# Patient Record
Sex: FEMALE | Race: WHITE | NOT HISPANIC OR LATINO | URBAN - METROPOLITAN AREA
[De-identification: names, ages, dates, MRNs, and addresses within clinical notes are randomized per-mention and may not be internally consistent; named-entity substitution may affect disease eponyms.]

---

## 2017-08-25 DIAGNOSIS — Z00.00 HEALTH CARE MAINTENANCE: Primary | ICD-10-CM

## 2017-08-28 LAB
25(OH)D3+25(OH)D2 SERPL-MCNC: 28.1 NG/ML (ref 30–100)
ALBUMIN SERPL-MCNC: 4.9 G/DL (ref 3.5–5.2)
ALBUMIN/GLOB SERPL: 1.8 G/DL
ALP SERPL-CCNC: 38 U/L (ref 39–117)
ALT SERPL-CCNC: 6 U/L (ref 1–33)
AST SERPL-CCNC: 15 U/L (ref 1–32)
BASOPHILS # BLD AUTO: 0.04 10*3/MM3 (ref 0–0.2)
BASOPHILS NFR BLD AUTO: 0.6 % (ref 0–1.5)
BILIRUB SERPL-MCNC: 0.7 MG/DL (ref 0.1–1.2)
BUN SERPL-MCNC: 13 MG/DL (ref 6–20)
BUN/CREAT SERPL: 17.1 (ref 7–25)
CALCIUM SERPL-MCNC: 9.9 MG/DL (ref 8.6–10.5)
CHLORIDE SERPL-SCNC: 102 MMOL/L (ref 98–107)
CHOLEST SERPL-MCNC: 171 MG/DL (ref 0–200)
CO2 SERPL-SCNC: 25.5 MMOL/L (ref 22–29)
CREAT SERPL-MCNC: 0.76 MG/DL (ref 0.57–1)
EOSINOPHIL # BLD AUTO: 0.21 10*3/MM3 (ref 0–0.7)
EOSINOPHIL NFR BLD AUTO: 3.2 % (ref 0.3–6.2)
ERYTHROCYTE [DISTWIDTH] IN BLOOD BY AUTOMATED COUNT: 12.5 % (ref 11.7–13)
GLOBULIN SER CALC-MCNC: 2.7 GM/DL
GLUCOSE SERPL-MCNC: 99 MG/DL (ref 65–99)
HBA1C MFR BLD: 5.02 % (ref 4.8–5.6)
HCT VFR BLD AUTO: 42.4 % (ref 35.6–45.5)
HDLC SERPL-MCNC: 56 MG/DL (ref 40–60)
HGB BLD-MCNC: 13.9 G/DL (ref 11.9–15.5)
IMM GRANULOCYTES # BLD: 0 10*3/MM3 (ref 0–0.03)
IMM GRANULOCYTES NFR BLD: 0 % (ref 0–0.5)
LDLC SERPL CALC-MCNC: 97 MG/DL (ref 0–100)
LYMPHOCYTES # BLD AUTO: 3.55 10*3/MM3 (ref 0.9–4.8)
LYMPHOCYTES NFR BLD AUTO: 54.9 % (ref 19.6–45.3)
MCH RBC QN AUTO: 29.4 PG (ref 26.9–32)
MCHC RBC AUTO-ENTMCNC: 32.8 G/DL (ref 32.4–36.3)
MCV RBC AUTO: 89.8 FL (ref 80.5–98.2)
MONOCYTES # BLD AUTO: 0.59 10*3/MM3 (ref 0.2–1.2)
MONOCYTES NFR BLD AUTO: 9.1 % (ref 5–12)
NEUTROPHILS # BLD AUTO: 2.08 10*3/MM3 (ref 1.9–8.1)
NEUTROPHILS NFR BLD AUTO: 32.2 % (ref 42.7–76)
PLATELET # BLD AUTO: 223 10*3/MM3 (ref 140–500)
POTASSIUM SERPL-SCNC: 4.4 MMOL/L (ref 3.5–5.2)
PROT SERPL-MCNC: 7.6 G/DL (ref 6–8.5)
RBC # BLD AUTO: 4.72 10*6/MM3 (ref 3.9–5.2)
SODIUM SERPL-SCNC: 143 MMOL/L (ref 136–145)
TRIGL SERPL-MCNC: 92 MG/DL (ref 0–150)
TSH SERPL DL<=0.005 MIU/L-ACNC: 1.39 MIU/ML (ref 0.27–4.2)
VLDLC SERPL CALC-MCNC: 18.4 MG/DL (ref 5–40)
WBC # BLD AUTO: 6.47 10*3/MM3 (ref 4.5–10.7)

## 2017-08-30 LAB
APPEARANCE UR: CLEAR
BACTERIA #/AREA URNS HPF: NORMAL /HPF
BACTERIA UR CULT: NO GROWTH
BACTERIA UR CULT: NORMAL
BILIRUB UR QL STRIP: NEGATIVE
COLOR UR: YELLOW
EPI CELLS #/AREA URNS HPF: NORMAL /HPF
GLUCOSE UR QL: NEGATIVE
HGB UR QL STRIP: NEGATIVE
KETONES UR QL STRIP: NEGATIVE
LEUKOCYTE ESTERASE UR QL STRIP: ABNORMAL
MICRO URNS: ABNORMAL
MUCOUS THREADS URNS QL MICRO: PRESENT /HPF
NITRITE UR QL STRIP: NEGATIVE
PH UR STRIP: 7.5 [PH] (ref 5–7.5)
PROT UR QL STRIP: NEGATIVE
RBC #/AREA URNS HPF: NORMAL /HPF
SP GR UR: 1.01 (ref 1–1.03)
URINALYSIS REFLEX: ABNORMAL
UROBILINOGEN UR STRIP-MCNC: 0.2 MG/DL (ref 0.2–1)
WBC #/AREA URNS HPF: NORMAL /HPF

## 2017-10-09 ENCOUNTER — OFFICE VISIT (OUTPATIENT)
Dept: INTERNAL MEDICINE | Facility: CLINIC | Age: 32
End: 2017-10-09

## 2017-10-09 VITALS
DIASTOLIC BLOOD PRESSURE: 84 MMHG | OXYGEN SATURATION: 99 % | BODY MASS INDEX: 24.32 KG/M2 | WEIGHT: 146 LBS | RESPIRATION RATE: 18 BRPM | SYSTOLIC BLOOD PRESSURE: 122 MMHG | HEART RATE: 67 BPM | HEIGHT: 65 IN

## 2017-10-09 DIAGNOSIS — Z00.00 HEALTH MAINTENANCE EXAMINATION: Primary | ICD-10-CM

## 2017-10-09 PROCEDURE — 99395 PREV VISIT EST AGE 18-39: CPT | Performed by: INTERNAL MEDICINE

## 2017-10-09 NOTE — PROGRESS NOTES
Chief Complaint  Cynthia Box is a 32 y.o. female who presents for Annual Exam (CPE)  .    History of Present Illness   Cynthia is here for routine CPE.  No new concerns.    Pap: two years ago  C-scope: n/a  Declines flu shot.    Exercise: 3 days a week for an hour.      There is no immunization history on file for this patient.    Review of Systems   Constitution: Negative for chills, fever and malaise/fatigue.   HENT: Negative for hearing loss, hoarse voice and sore throat.    Eyes: Negative for blurred vision, double vision and visual disturbance.   Cardiovascular: Negative for chest pain, leg swelling and palpitations.   Respiratory: Negative for cough and shortness of breath.    Endocrine: Negative for polydipsia and polyuria.   Hematologic/Lymphatic: Does not bruise/bleed easily.   Skin: Negative for rash and suspicious lesions.   Musculoskeletal: Negative for arthritis and myalgias.   Gastrointestinal: Negative for abdominal pain, change in bowel habit, constipation, diarrhea, dysphagia, hematochezia, melena, nausea and vomiting.   Genitourinary: Negative for dysuria and hematuria.   Neurological: Negative for dizziness, headaches and light-headedness.   Psychiatric/Behavioral: Negative for depression. The patient is not nervous/anxious.        There is no problem list on file for this patient.      History reviewed. No pertinent past medical history.    Past Surgical History:   Procedure Laterality Date   • LEEP  2009       Family History   Problem Relation Age of Onset   • Heart attack Father    • Cancer Maternal Grandmother      colon   • Heart attack Paternal Grandfather        Social History     Social History   • Marital status:      Spouse name: N/A   • Number of children: 1   • Years of education: N/A     Occupational History   •       Social History Main Topics   • Smoking status: Never Smoker   • Smokeless tobacco: Never Used   • Alcohol use Yes      Comment: socially   • Drug  "use: No   • Sexual activity: Not on file     Other Topics Concern   • Not on file     Social History Narrative       No current outpatient prescriptions on file prior to visit.     No current facility-administered medications on file prior to visit.        Allergies   Allergen Reactions   • Codeine        Vitals:    10/09/17 1358   BP: 122/84   Pulse: 67   Resp: 18   SpO2: 99%   Weight: 146 lb (66.2 kg)   Height: 65\" (165.1 cm)       Body mass index is 24.3 kg/(m^2).    Objective   Physical Exam   Constitutional: She is oriented to person, place, and time. She appears well-developed and well-nourished. No distress.   HENT:   Head: Normocephalic and atraumatic.   Nose: Nose normal.   Mouth/Throat: Oropharynx is clear and moist.   Eyes: Conjunctivae and EOM are normal. Pupils are equal, round, and reactive to light. No scleral icterus.   Neck: Normal range of motion. Neck supple. No thyromegaly present.   Cardiovascular: Normal rate, regular rhythm and normal heart sounds.  Exam reveals no gallop and no friction rub.    No murmur heard.  Pulses:       Carotid pulses are 2+ on the right side, and 2+ on the left side.       Femoral pulses are 2+ on the right side, and 2+ on the left side.       Dorsalis pedis pulses are 2+ on the right side, and 2+ on the left side.        Posterior tibial pulses are 2+ on the right side, and 2+ on the left side.   Pulmonary/Chest: Effort normal and breath sounds normal. No respiratory distress. She has no wheezes. She has no rales. Right breast exhibits no mass and no nipple discharge. Left breast exhibits no mass and no nipple discharge.   Abdominal: Soft. Bowel sounds are normal. She exhibits no distension and no mass. There is no tenderness.   Musculoskeletal: Normal range of motion. She exhibits no edema.       Vascular Status -  Her exam exhibits no right foot edema. Her exam exhibits no left foot edema.   Skin Integrity  -  Her right foot skin is intact.     Cynthia 's left foot " skin is intact. .  Lymphadenopathy:     She has no cervical adenopathy.     She has no axillary adenopathy.        Right: No inguinal and no supraclavicular adenopathy present.        Left: No inguinal and no supraclavicular adenopathy present.   Neurological: She is alert and oriented to person, place, and time. She has normal reflexes. No cranial nerve deficit.   Skin: Skin is warm and dry.   Psychiatric: She has a normal mood and affect. Her speech is normal and behavior is normal. Judgment and thought content normal. Cognition and memory are normal.   Vitals reviewed.        Results for orders placed or performed in visit on 08/25/17   Comprehensive Metabolic Panel   Result Value Ref Range    Glucose 99 65 - 99 mg/dL    BUN 13 6 - 20 mg/dL    Creatinine 0.76 0.57 - 1.00 mg/dL    eGFR Non African Am 87 >60 mL/min/1.73    eGFR African Am 106 >60 mL/min/1.73    BUN/Creatinine Ratio 17.1 7.0 - 25.0    Sodium 143 136 - 145 mmol/L    Potassium 4.4 3.5 - 5.2 mmol/L    Chloride 102 98 - 107 mmol/L    Total CO2 25.5 22.0 - 29.0 mmol/L    Calcium 9.9 8.6 - 10.5 mg/dL    Total Protein 7.6 6.0 - 8.5 g/dL    Albumin 4.90 3.50 - 5.20 g/dL    Globulin 2.7 gm/dL    A/G Ratio 1.8 g/dL    Total Bilirubin 0.7 0.1 - 1.2 mg/dL    Alkaline Phosphatase 38 (L) 39 - 117 U/L    AST (SGOT) 15 1 - 32 U/L    ALT (SGPT) 6 1 - 33 U/L   Lipid Panel   Result Value Ref Range    Total Cholesterol 171 0 - 200 mg/dL    Triglycerides 92 0 - 150 mg/dL    HDL Cholesterol 56 40 - 60 mg/dL    VLDL Cholesterol 18.4 5 - 40 mg/dL    LDL Cholesterol  97 0 - 100 mg/dL   TSH Rfx On Abnormal To Free T4   Result Value Ref Range    TSH 1.39 0.27 - 4.2 mIU/mL   UA / M With / Rflx Culture([facility] ONLY)   Result Value Ref Range    Specific Gravity, UA 1.013 1.005 - 1.030    pH, UA 7.5 5.0 - 7.5    Color, UA Yellow Yellow    Appearance, UA Clear Clear    Leukocytes, UA 1+ (A) Negative    Protein Negative Negative/Trace    Glucose, UA Negative Negative    Ketones  Negative Negative    Blood, UA Negative Negative    Bilirubin, UA Negative Negative    Urobilinogen, UA 0.2 0.2 - 1.0 mg/dL    Nitrite, UA Negative Negative    Microscopic Examination See below:     Urinalysis Reflex Comment    Vitamin D 25 Hydroxy   Result Value Ref Range    25 Hydroxy, Vitamin D 28.1 (L) 30.0 - 100.0 ng/mL   Hemoglobin A1c   Result Value Ref Range    Hemoglobin A1C 5.02 4.80 - 5.60 %   Microscopic Examination   Result Value Ref Range    WBC, UA 0-5 0 - 5 /hpf    RBC, UA 0-2 0 - 2 /hpf    Epithelial Cells (non renal) 0-10 0 - 10 /hpf    Mucus, UA Present Not Estab. /hpf    Bacteria, UA Few None seen/Few /hpf   Urine Culture, Routine   Result Value Ref Range    Urine Culture Final report     Result 1 No growth    CBC & Differential   Result Value Ref Range    WBC 6.47 4.50 - 10.70 10*3/mm3    RBC 4.72 3.90 - 5.20 10*6/mm3    Hemoglobin 13.9 11.9 - 15.5 g/dL    Hematocrit 42.4 35.6 - 45.5 %    MCV 89.8 80.5 - 98.2 fL    MCH 29.4 26.9 - 32.0 pg    MCHC 32.8 32.4 - 36.3 g/dL    RDW 12.5 11.7 - 13.0 %    Platelets 223 140 - 500 10*3/mm3    Neutrophil Rel % 32.2 (L) 42.7 - 76.0 %    Lymphocyte Rel % 54.9 (H) 19.6 - 45.3 %    Monocyte Rel % 9.1 5.0 - 12.0 %    Eosinophil Rel % 3.2 0.3 - 6.2 %    Basophil Rel % 0.6 0.0 - 1.5 %    Neutrophils Absolute 2.08 1.90 - 8.10 10*3/mm3    Lymphocytes Absolute 3.55 0.90 - 4.80 10*3/mm3    Monocytes Absolute 0.59 0.20 - 1.20 10*3/mm3    Eosinophils Absolute 0.21 0.00 - 0.70 10*3/mm3    Basophils Absolute 0.04 0.00 - 0.20 10*3/mm3    Immature Granulocyte Rel % 0.0 0.0 - 0.5 %    Immature Grans Absolute 0.00 0.00 - 0.03 10*3/mm3       Assessment/Plan   Diagnoses and all orders for this visit:    Health maintenance examination        Discussion/Summary  Cynthia is here for routine CPE.  She is up to date on health maintenance.  Her labs all look ok.  The differential is off on her CBC but her WBC and absolute numbers are normal.  This is similar to last year.  We will  recheck yearly.      No Follow-up on file.

## 2018-10-05 DIAGNOSIS — Z00.00 HEALTH CARE MAINTENANCE: Primary | ICD-10-CM

## 2020-01-01 PROCEDURE — 99284 EMERGENCY DEPT VISIT MOD MDM: CPT

## 2020-01-02 ENCOUNTER — APPOINTMENT (OUTPATIENT)
Dept: CT IMAGING | Facility: HOSPITAL | Age: 35
End: 2020-01-02

## 2020-01-02 ENCOUNTER — HOSPITAL ENCOUNTER (EMERGENCY)
Facility: HOSPITAL | Age: 35
Discharge: HOME | End: 2020-01-02
Attending: EMERGENCY MEDICINE | Admitting: EMERGENCY MEDICINE

## 2020-01-02 VITALS
WEIGHT: 138.9 LBS | DIASTOLIC BLOOD PRESSURE: 68 MMHG | SYSTOLIC BLOOD PRESSURE: 102 MMHG | OXYGEN SATURATION: 98 % | TEMPERATURE: 98.1 F | RESPIRATION RATE: 16 BRPM | BODY MASS INDEX: 23.14 KG/M2 | HEIGHT: 65 IN | HEART RATE: 88 BPM

## 2020-01-02 DIAGNOSIS — K50.00 TERMINAL ILEITIS WITHOUT COMPLICATION (HCC): ICD-10-CM

## 2020-01-02 DIAGNOSIS — K57.32 CECAL DIVERTICULITIS: Primary | ICD-10-CM

## 2020-01-02 LAB
ALBUMIN SERPL-MCNC: 4.3 G/DL (ref 3.5–5.2)
ALBUMIN/GLOB SERPL: 1.5 G/DL
ALP SERPL-CCNC: 40 U/L (ref 39–117)
ALT SERPL W P-5'-P-CCNC: 7 U/L (ref 1–33)
ANION GAP SERPL CALCULATED.3IONS-SCNC: 11.6 MMOL/L (ref 5–15)
AST SERPL-CCNC: 11 U/L (ref 1–32)
BACTERIA UR QL AUTO: ABNORMAL /HPF
BASOPHILS # BLD AUTO: 0.04 10*3/MM3 (ref 0–0.2)
BASOPHILS NFR BLD AUTO: 0.3 % (ref 0–1.5)
BILIRUB SERPL-MCNC: 0.4 MG/DL (ref 0.2–1.2)
BILIRUB UR QL STRIP: NEGATIVE
BUN BLD-MCNC: 11 MG/DL (ref 6–20)
BUN/CREAT SERPL: 16.2 (ref 7–25)
CALCIUM SPEC-SCNC: 8.9 MG/DL (ref 8.6–10.5)
CHLORIDE SERPL-SCNC: 103 MMOL/L (ref 98–107)
CLARITY UR: CLEAR
CO2 SERPL-SCNC: 24.4 MMOL/L (ref 22–29)
COLOR UR: YELLOW
CREAT BLD-MCNC: 0.68 MG/DL (ref 0.57–1)
DEPRECATED RDW RBC AUTO: 37.6 FL (ref 37–54)
EOSINOPHIL # BLD AUTO: 0.2 10*3/MM3 (ref 0–0.4)
EOSINOPHIL NFR BLD AUTO: 1.6 % (ref 0.3–6.2)
ERYTHROCYTE [DISTWIDTH] IN BLOOD BY AUTOMATED COUNT: 11.9 % (ref 12.3–15.4)
GFR SERPL CREATININE-BSD FRML MDRD: 98 ML/MIN/1.73
GLOBULIN UR ELPH-MCNC: 2.9 GM/DL
GLUCOSE BLD-MCNC: 101 MG/DL (ref 65–99)
GLUCOSE UR STRIP-MCNC: NEGATIVE MG/DL
HCG SERPL QL: NEGATIVE
HCT VFR BLD AUTO: 36.2 % (ref 34–46.6)
HGB BLD-MCNC: 12.4 G/DL (ref 12–15.9)
HGB UR QL STRIP.AUTO: NEGATIVE
HYALINE CASTS UR QL AUTO: ABNORMAL /LPF
IMM GRANULOCYTES # BLD AUTO: 0.04 10*3/MM3 (ref 0–0.05)
IMM GRANULOCYTES NFR BLD AUTO: 0.3 % (ref 0–0.5)
KETONES UR QL STRIP: NEGATIVE
LEUKOCYTE ESTERASE UR QL STRIP.AUTO: ABNORMAL
LYMPHOCYTES # BLD AUTO: 1.61 10*3/MM3 (ref 0.7–3.1)
LYMPHOCYTES NFR BLD AUTO: 13.3 % (ref 19.6–45.3)
MCH RBC QN AUTO: 29.9 PG (ref 26.6–33)
MCHC RBC AUTO-ENTMCNC: 34.3 G/DL (ref 31.5–35.7)
MCV RBC AUTO: 87.2 FL (ref 79–97)
MONOCYTES # BLD AUTO: 0.9 10*3/MM3 (ref 0.1–0.9)
MONOCYTES NFR BLD AUTO: 7.4 % (ref 5–12)
NEUTROPHILS # BLD AUTO: 9.34 10*3/MM3 (ref 1.7–7)
NEUTROPHILS NFR BLD AUTO: 77.1 % (ref 42.7–76)
NITRITE UR QL STRIP: NEGATIVE
NRBC BLD AUTO-RTO: 0 /100 WBC (ref 0–0.2)
PH UR STRIP.AUTO: 6.5 [PH] (ref 5–8)
PLATELET # BLD AUTO: 181 10*3/MM3 (ref 140–450)
PMV BLD AUTO: 10.1 FL (ref 6–12)
POTASSIUM BLD-SCNC: 3.8 MMOL/L (ref 3.5–5.2)
PROT SERPL-MCNC: 7.2 G/DL (ref 6–8.5)
PROT UR QL STRIP: NEGATIVE
RBC # BLD AUTO: 4.15 10*6/MM3 (ref 3.77–5.28)
RBC # UR: ABNORMAL /HPF
REF LAB TEST METHOD: ABNORMAL
SODIUM BLD-SCNC: 139 MMOL/L (ref 136–145)
SP GR UR STRIP: 1.01 (ref 1–1.03)
SQUAMOUS #/AREA URNS HPF: ABNORMAL /HPF
UROBILINOGEN UR QL STRIP: ABNORMAL
WBC NRBC COR # BLD: 12.13 10*3/MM3 (ref 3.4–10.8)
WBC UR QL AUTO: ABNORMAL /HPF

## 2020-01-02 PROCEDURE — 96375 TX/PRO/DX INJ NEW DRUG ADDON: CPT

## 2020-01-02 PROCEDURE — 25010000002 MORPHINE PER 10 MG: Performed by: EMERGENCY MEDICINE

## 2020-01-02 PROCEDURE — 96376 TX/PRO/DX INJ SAME DRUG ADON: CPT

## 2020-01-02 PROCEDURE — 25010000002 ONDANSETRON PER 1 MG: Performed by: EMERGENCY MEDICINE

## 2020-01-02 PROCEDURE — 81001 URINALYSIS AUTO W/SCOPE: CPT

## 2020-01-02 PROCEDURE — 96361 HYDRATE IV INFUSION ADD-ON: CPT

## 2020-01-02 PROCEDURE — 96365 THER/PROPH/DIAG IV INF INIT: CPT

## 2020-01-02 PROCEDURE — 74177 CT ABD & PELVIS W/CONTRAST: CPT

## 2020-01-02 PROCEDURE — 80053 COMPREHEN METABOLIC PANEL: CPT | Performed by: EMERGENCY MEDICINE

## 2020-01-02 PROCEDURE — 25010000002 IOPAMIDOL 61 % SOLUTION: Performed by: EMERGENCY MEDICINE

## 2020-01-02 PROCEDURE — 25010000002 PIPERACILLIN SOD-TAZOBACTAM PER 1 G: Performed by: EMERGENCY MEDICINE

## 2020-01-02 PROCEDURE — 85025 COMPLETE CBC W/AUTO DIFF WBC: CPT | Performed by: EMERGENCY MEDICINE

## 2020-01-02 PROCEDURE — 84703 CHORIONIC GONADOTROPIN ASSAY: CPT | Performed by: EMERGENCY MEDICINE

## 2020-01-02 RX ORDER — HYDROCODONE BITARTRATE AND ACETAMINOPHEN 7.5; 325 MG/1; MG/1
1 TABLET ORAL EVERY 4 HOURS PRN
Qty: 20 TABLET | Refills: 0 | Status: SHIPPED | OUTPATIENT
Start: 2020-01-02 | End: 2020-01-16

## 2020-01-02 RX ORDER — AMOXICILLIN AND CLAVULANATE POTASSIUM 875; 125 MG/1; MG/1
1 TABLET, FILM COATED ORAL 2 TIMES DAILY
Qty: 20 TABLET | Refills: 0 | Status: SHIPPED | OUTPATIENT
Start: 2020-01-02 | End: 2020-01-16

## 2020-01-02 RX ORDER — MORPHINE SULFATE 2 MG/ML
4 INJECTION, SOLUTION INTRAMUSCULAR; INTRAVENOUS ONCE
Status: COMPLETED | OUTPATIENT
Start: 2020-01-02 | End: 2020-01-02

## 2020-01-02 RX ORDER — ONDANSETRON 2 MG/ML
4 INJECTION INTRAMUSCULAR; INTRAVENOUS ONCE
Status: COMPLETED | OUTPATIENT
Start: 2020-01-02 | End: 2020-01-02

## 2020-01-02 RX ORDER — ONDANSETRON 4 MG/1
4 TABLET, FILM COATED ORAL EVERY 8 HOURS PRN
Qty: 20 TABLET | Refills: 0 | Status: SHIPPED | OUTPATIENT
Start: 2020-01-02 | End: 2020-02-19

## 2020-01-02 RX ORDER — SODIUM CHLORIDE 0.9 % (FLUSH) 0.9 %
10 SYRINGE (ML) INJECTION AS NEEDED
Status: DISCONTINUED | OUTPATIENT
Start: 2020-01-02 | End: 2020-01-02 | Stop reason: HOSPADM

## 2020-01-02 RX ORDER — SODIUM CHLORIDE 9 MG/ML
125 INJECTION, SOLUTION INTRAVENOUS CONTINUOUS
Status: DISCONTINUED | OUTPATIENT
Start: 2020-01-02 | End: 2020-01-02 | Stop reason: HOSPADM

## 2020-01-02 RX ADMIN — IOPAMIDOL 85 ML: 612 INJECTION, SOLUTION INTRAVENOUS at 02:48

## 2020-01-02 RX ADMIN — MORPHINE SULFATE 4 MG: 2 INJECTION, SOLUTION INTRAMUSCULAR; INTRAVENOUS at 03:44

## 2020-01-02 RX ADMIN — SODIUM CHLORIDE 125 ML/HR: 9 INJECTION, SOLUTION INTRAVENOUS at 01:28

## 2020-01-02 RX ADMIN — ONDANSETRON 4 MG: 2 INJECTION INTRAMUSCULAR; INTRAVENOUS at 01:29

## 2020-01-02 RX ADMIN — TAZOBACTAM SODIUM AND PIPERACILLIN SODIUM 3.38 G: 375; 3 INJECTION, SOLUTION INTRAVENOUS at 03:47

## 2020-01-02 RX ADMIN — MORPHINE SULFATE 4 MG: 2 INJECTION, SOLUTION INTRAMUSCULAR; INTRAVENOUS at 01:30

## 2020-01-02 NOTE — ED PROVIDER NOTES
" EMERGENCY DEPARTMENT ENCOUNTER    CHIEF COMPLAINT  Chief Complaint: RLQ abd pain  History given by: self  History limited by: nothing  Room Number: 14/14  PMD: Provider, No Known      HPI:  Pt is a 34 y.o. female who presents complaining of constant RLQ abd pain that started around 2100 this evening. Pt also c/o nausea and \"loose\" stool. Pt denies vomiting, dysuria, hematuria and fever. The pt states having a constant \"bloated\" feeling starting this morning around 1100 with several episodes of \"loose stool\" and nausea throughout the day. As the day progressed, she developed a sudden severe \"sharp\" RLQ abd pain at 2100. Due to severity of pain, she came to the ED for further evaluation. LKMP was 12/21/19.     Duration:  2100 this evening  Onset: sudden  Timing: constant  Location: RLQ abd  Radiation: none stated  Quality: pain  Intensity/Severity: moderate  Progression: unchanged  Associated Symptoms: nausea and \"loose\" stool  Aggravating Factors: none stated  Alleviating Factors: none stated  Previous Episodes: none stated  Treatment before arrival: none stated    PAST MEDICAL HISTORY  Active Ambulatory Problems     Diagnosis Date Noted   • No Active Ambulatory Problems     Resolved Ambulatory Problems     Diagnosis Date Noted   • No Resolved Ambulatory Problems     No Additional Past Medical History       PAST SURGICAL HISTORY  Past Surgical History:   Procedure Laterality Date   • LEEP  2009   • TONSILLECTOMY         FAMILY HISTORY  Family History   Problem Relation Age of Onset   • Heart attack Father    • Cancer Maternal Grandmother         colon   • Heart attack Paternal Grandfather        SOCIAL HISTORY  Social History     Socioeconomic History   • Marital status:      Spouse name: Not on file   • Number of children: 1   • Years of education: Not on file   • Highest education level: Not on file   Occupational History   • Occupation:    Tobacco Use   • Smoking status: Never Smoker   • " "Smokeless tobacco: Never Used   Substance and Sexual Activity   • Alcohol use: Yes     Comment: socially   • Drug use: No   • Sexual activity: Defer       ALLERGIES  Codeine    REVIEW OF SYSTEMS  Review of Systems   Constitutional: Negative for fever.   HENT: Negative for sore throat.    Respiratory: Negative for cough and shortness of breath.    Cardiovascular: Negative for chest pain.   Gastrointestinal: Positive for abdominal pain (RLQ), diarrhea (\"loose\" stool) and nausea. Negative for vomiting.   Genitourinary: Negative for dysuria.   Musculoskeletal: Negative for neck pain.   Skin: Negative for rash.   Neurological: Negative for weakness, numbness and headaches.   All other systems reviewed and are negative.      PHYSICAL EXAM  ED Triage Vitals [01/02/20 0000]   Temp Heart Rate Resp BP SpO2   98.1 °F (36.7 °C) 111 18 -- 100 %      Temp src Heart Rate Source Patient Position BP Location FiO2 (%)   Tympanic -- -- -- --       Physical Exam   Constitutional: She is oriented to person, place, and time. She appears distressed (appears in obvious discomfort).   HENT:   Head: Normocephalic and atraumatic.   Eyes: Pupils are equal, round, and reactive to light. EOM are normal.   Neck: Normal range of motion. Neck supple.   Cardiovascular: Normal rate, regular rhythm and normal heart sounds.   Pulmonary/Chest: Effort normal and breath sounds normal. No respiratory distress.   Abdominal: Soft. Bowel sounds are hyperactive (mild). There is tenderness in the right lower quadrant. There is no rebound and no guarding.   There is referred RLQ pain when palpating other areas of the abd   Musculoskeletal: Normal range of motion. She exhibits no edema.   Neurological: She is alert and oriented to person, place, and time. She has normal sensation and normal strength.   Skin: Skin is warm and dry. No rash noted.   Psychiatric: Mood and affect normal.   Nursing note and vitals reviewed.      LAB RESULTS  Lab Results (last 24 hours) "     ** No results found for the last 24 hours. **          I ordered the above labs and reviewed the results    RADIOLOGY  CT Abdomen Pelvis With Contrast   Final Result       1. Markedly thick walled appearance to the patient's cecum with adjacent   pericolonic soft tissue stranding and trace fluid. There is a   diverticulum within this area, and this may reflect diverticulitis,   although focal colitis, either infectious or inflammatory etiology, is   not excluded. Of note, the patient's appendix is normal. Short-term   follow-up exam to exclude any underlying lesion is suggested.   2. Hepatosplenomegaly, of uncertain clinical significance.       Radiation dose reduction techniques were utilized, including automated   exposure control and exposure modulation based on body size.       This report was finalized on 1/2/2020 3:16 AM by Dr. Mary Grace Shaw M.D.               I ordered the above noted radiological studies. Interpreted by radiologist. Reviewed by me in PACS.     PROGRESS AND CONSULTS     0023 UA ordered.    0111 CMP ordered. HCG ordered. Zofran ordered. Morphine 4 mg ordered.    0208 CT Abd Pelvis with contrast ordered.     0211 Patient is resting comfortably and in NAD. Patient is stable. BP- 125/79 HR- 111 Temp- 98.1 °F (36.7 °C) (Tympanic) O2 sat- 100%. Informed the patient labs show WBC of 12.13. Discussed the plan for CT Abd Pelvis. Pt understands and agrees with the plan, all questions answered.    0322 Rechecked pt. Pt is resting comfortably in NAD with HR of 104 and BP of 116/69. Discussed w/pt results of CT Abd Pelvis shows abnormalities. Plan is to administer Zosyn IV and d/c with Augmentin. Pt is to f/u with GI for possible colonoscopy. Pt was instructed to return to the ED with worsening sx's. Pt understands and agrees with the plan. All questions were answered.     028 Morphine 4 mg ordered. Zosyn ordered.     MEDICAL DECISION MAKING  Results were reviewed/discussed with the patient and  they were also made aware of online access. Pt also made aware that some labs, such as cultures, will not be resulted during ER visit and follow up with PMD is necessary.     MDM  Number of Diagnoses or Management Options     Amount and/or Complexity of Data Reviewed  Clinical lab tests: ordered and reviewed (WBC 12.13  BUN 11   Creatinine 0.68)  Tests in the radiology section of CPT®: ordered and reviewed (CT Abd Pelvis)  Decide to obtain previous medical records or to obtain history from someone other than the patient: yes  Independent visualization of images, tracings, or specimens: yes           DIAGNOSIS  Final diagnoses:   Cecal diverticulitis   Terminal ileitis without complication (CMS/[facility])       DISPOSITION  DISCHARGE    Patient discharged in stable condition.    Reviewed implications of results, diagnosis, meds, responsibility to follow up, warning signs and symptoms of possible worsening, potential complications and reasons to return to ER.    Patient/Family voiced understanding of above instructions.    Discussed plan for discharge, as there is no emergent indication for admission. Patient referred to primary care provider for BP management due to today's BP. Pt/family is agreeable and understands need for follow up and repeat testing.  Pt is aware that discharge does not mean that nothing is wrong but it indicates no emergency is present that requires admission and they must continue care with follow-up as given below or physician of their choice.     FOLLOW-UP  Connor Hickey MD  [address] [facility] [address] [phone]    Call today  to schedule follow up for cecal diverticulitis and terminal ileitis         Medication List      New Prescriptions    amoxicillin-clavulanate 875-125 MG per tablet  Commonly known as:  AUGMENTIN  Take 1 tablet by mouth 2 (Two) Times a Day.     HYDROcodone-acetaminophen 7.5-325 MG per tablet  Commonly known as:  NORCO  Take 1 tablet by mouth  Every 4 (Four) Hours As Needed for Moderate Pain .     ondansetron 4 MG tablet  Commonly known as:  ZOFRAN  Take 1 tablet by mouth Every 8 (Eight) Hours As Needed for Nausea.              Latest Documented Vital Signs:  As of 11:26 PM  BP- 102/68 HR- 88 Temp- 98.1 °F (36.7 °C) (Tympanic) O2 sat- 98%    --  Documentation assistance provided by ernesto Flower for Dr. Julian Chopra.  Information recorded by the ernesto was done at my direction and has been verified and validated by me.     Mundo Villanueva  01/02/20 0201       Mundo Villanueva  01/02/20 0213       Mundo Villanueva  01/02/20 0305       Mundo Villanueva  01/02/20 8682       Julian Chopra MD  01/02/20 7448       Julian Chopra MD  01/08/20 9535

## 2020-01-16 ENCOUNTER — OFFICE VISIT (OUTPATIENT)
Dept: GASTROENTEROLOGY | Facility: CLINIC | Age: 35
End: 2020-01-16

## 2020-01-16 VITALS
TEMPERATURE: 97.5 F | DIASTOLIC BLOOD PRESSURE: 78 MMHG | HEIGHT: 65 IN | BODY MASS INDEX: 24.16 KG/M2 | WEIGHT: 145 LBS | SYSTOLIC BLOOD PRESSURE: 124 MMHG

## 2020-01-16 DIAGNOSIS — R16.2 HEPATOSPLENOMEGALY: ICD-10-CM

## 2020-01-16 DIAGNOSIS — R93.3 ABNORMAL CT SCAN, COLON: Primary | ICD-10-CM

## 2020-01-16 PROCEDURE — 99204 OFFICE O/P NEW MOD 45 MIN: CPT | Performed by: INTERNAL MEDICINE

## 2020-01-16 NOTE — PROGRESS NOTES
Subjective   Chief Complaint   Patient presents with   • Diverticulitis       Cynthia Box is a  34 y.o. female here for an initial visit for abdominal pain, altered bowel habits and abnormal CT scan.     Patient reports on 1/1/20 she developed acute onset of right lower quadrant pain.  There was no associated fever that she could recognize.  The pain was intense and prompted visit to the emergency room.  She had elevated white blood cell count at the time.  CT scan of the abdomen and pelvis showed thickening of the cecum as well as the terminal ileum.  There was cecal diverticulosis noted so diverticulitis was in the differential.  Incidentally she also had hepatosplenomegaly.  Since her visit, she completed a course of antibiotics and she has been on a very restricted diet.  She was on a clear liquid diet for 3 days and then transition to a very strict FODMAP diet.  She is had complete resolution of her symptoms and actually feels better than she has in a long time.    She is a long history of IBS-like symptoms dating back to her childhood.  For the last 8 months however she has been having increasing abdominal discomfort and fairly frequent loose stools.  She denies blood in her stool urgency.  She is had no rashes joint pain eye pain or redness.  Her father has GI symptoms but no formal diagnosis.  She is never been tested for celiac disease.  She relates symptoms suggestive of lactose intolerance.  Weight is been stable.  HPI  Past Medical History:   Diagnosis Date   • IBS (irritable bowel syndrome)      Past Surgical History:   Procedure Laterality Date   • LEEP  2009   • TONSILLECTOMY         Current Outpatient Medications:   •  ondansetron (ZOFRAN) 4 MG tablet, Take 1 tablet by mouth Every 8 (Eight) Hours As Needed for Nausea., Disp: 20 tablet, Rfl: 0  PRN Meds:.  Allergies   Allergen Reactions   • Codeine Hives     Social History     Socioeconomic History   • Marital status:      Spouse name: Not  on file   • Number of children: 1   • Years of education: Not on file   • Highest education level: Not on file   Occupational History   • Occupation:    Tobacco Use   • Smoking status: Never Smoker   • Smokeless tobacco: Never Used   Substance and Sexual Activity   • Alcohol use: Yes     Comment: socially   • Drug use: No   • Sexual activity: Defer     Family History   Problem Relation Age of Onset   • Heart attack Father    • Cancer Maternal Grandmother         colon   • Colon cancer Maternal Grandmother    • Heart attack Paternal Grandfather      Review of Systems   Constitutional: Negative for appetite change and unexpected weight change.   Eyes: Negative.    Gastrointestinal: Negative for abdominal pain, blood in stool and diarrhea.   Musculoskeletal: Negative.    Skin: Negative.    All other systems reviewed and are negative.    Vitals:    01/16/20 1315   BP: 124/78   Temp: 97.5 °F (36.4 °C)         01/16/20  1315   Weight: 65.8 kg (145 lb)       Objective   Physical Exam   Constitutional: She appears well-developed and well-nourished.   HENT:   Head: Normocephalic and atraumatic.   Eyes: No scleral icterus.   Abdominal: Soft. She exhibits no distension and no mass. There is no tenderness.   Musculoskeletal: She exhibits no edema or tenderness.   Neurological: She is alert.   Skin: Skin is warm and dry.   Psychiatric: She has a normal mood and affect.     No radiology results for the last 7 days    Assessment/Plan   Diagnoses and all orders for this visit:    Abnormal CT scan, colon  -     Case Request; Standing  -     Case Request  -     CBC & Differential  -     C-reactive Protein  -     Celiac Ab tTG DGP TIgA  -     CT Abdomen Pelvis With Contrast; Future    Hepatosplenomegaly  -     CT Abdomen Pelvis With Contrast; Future    Other orders  -     Follow Anesthesia Guidelines / Standing Orders; Future  -     Obtain Informed Consent; Future  -     Implement Anesthesia orders day of procedure.;  Standing  -     Obtain informed consent; Standing  -     Verify bowel prep was successful; Standing  -     Give tap water enema if bowel prep was insufficient; Standing      Plan:  · Recent abnormal CT reviewed-she very well may have had an acute infection or case of diverticulitis however given her longstanding symptoms and the location of her inflammation on CT, IBD is certainly high in the differential needs to be ruled out.  She is had a good response to antibiotics and diet change and is essentially asymptomatic at this time.  I recommended progressive reintroduction of normal foods.  · She will need a colonoscopy-would wait 6 to 8 weeks to allow inflammation from the acute process to change as long as she continues to do well.  · We will recheck her white blood cell count today to make sure that it has normalized-we will also check a CRP and a celiac panel  · Given hepatosplenomegaly with no obvious etiology, will repeat CT scan in 6 months to ensure resolution of intestinal as well as other changes on CAT scan

## 2020-01-17 LAB
BASOPHILS # BLD AUTO: 0.05 10*3/MM3 (ref 0–0.2)
BASOPHILS NFR BLD AUTO: 0.8 % (ref 0–1.5)
CRP SERPL-MCNC: <0.03 MG/DL (ref 0–0.5)
EOSINOPHIL # BLD AUTO: 0.12 10*3/MM3 (ref 0–0.4)
EOSINOPHIL NFR BLD AUTO: 2 % (ref 0.3–6.2)
ERYTHROCYTE [DISTWIDTH] IN BLOOD BY AUTOMATED COUNT: 11.9 % (ref 12.3–15.4)
GLIADIN PEPTIDE IGA SER-ACNC: 3 UNITS (ref 0–19)
GLIADIN PEPTIDE IGG SER-ACNC: 3 UNITS (ref 0–19)
HCT VFR BLD AUTO: 37.3 % (ref 34–46.6)
HGB BLD-MCNC: 12.7 G/DL (ref 12–15.9)
IGA SERPL-MCNC: 187 MG/DL (ref 87–352)
IMM GRANULOCYTES # BLD AUTO: 0.02 10*3/MM3 (ref 0–0.05)
IMM GRANULOCYTES NFR BLD AUTO: 0.3 % (ref 0–0.5)
LYMPHOCYTES # BLD AUTO: 1.57 10*3/MM3 (ref 0.7–3.1)
LYMPHOCYTES NFR BLD AUTO: 25.7 % (ref 19.6–45.3)
MCH RBC QN AUTO: 29.5 PG (ref 26.6–33)
MCHC RBC AUTO-ENTMCNC: 34 G/DL (ref 31.5–35.7)
MCV RBC AUTO: 86.7 FL (ref 79–97)
MONOCYTES # BLD AUTO: 0.49 10*3/MM3 (ref 0.1–0.9)
MONOCYTES NFR BLD AUTO: 8 % (ref 5–12)
NEUTROPHILS # BLD AUTO: 3.85 10*3/MM3 (ref 1.7–7)
NEUTROPHILS NFR BLD AUTO: 63.2 % (ref 42.7–76)
NRBC BLD AUTO-RTO: 0 /100 WBC (ref 0–0.2)
PLATELET # BLD AUTO: 248 10*3/MM3 (ref 140–450)
RBC # BLD AUTO: 4.3 10*6/MM3 (ref 3.77–5.28)
TTG IGA SER-ACNC: <2 U/ML (ref 0–3)
TTG IGG SER-ACNC: 5 U/ML (ref 0–5)
WBC # BLD AUTO: 6.1 10*3/MM3 (ref 3.4–10.8)

## 2020-01-20 ENCOUNTER — TELEPHONE (OUTPATIENT)
Dept: GASTROENTEROLOGY | Facility: CLINIC | Age: 35
End: 2020-01-20

## 2020-01-20 NOTE — TELEPHONE ENCOUNTER
----- Message from Betzy Mejia sent at 1/17/2020  4:35 PM EST -----  Regarding: questions   Contact: [phone]  Pt is calling with questions about how far her scope is schedule out & is asking if that is okay or if she needs to get it done sooner

## 2020-01-21 ENCOUNTER — TELEPHONE (OUTPATIENT)
Dept: GASTROENTEROLOGY | Facility: CLINIC | Age: 35
End: 2020-01-21

## 2020-01-21 NOTE — TELEPHONE ENCOUNTER
Recent labs were normal including celiac panel.  Previous elevation in white blood cell count has resolved

## 2020-01-23 NOTE — TELEPHONE ENCOUNTER
Called pt and advised per Dr Samuels that her recent labs were normal including celiac panel. Previous elevation in white blood cell count has ressolved.     Pt verb understanding .  Pt reports that she has to change the date of her c/s and reports that she can not get back on the schedule until end of April or may.  Pt is asking if it is ok to wait this long. Advised will send message to Dr Samuels.

## 2020-02-19 ENCOUNTER — HOSPITAL ENCOUNTER (OUTPATIENT)
Facility: HOSPITAL | Age: 35
Setting detail: OBSERVATION
Discharge: HOME | End: 2020-02-22
Attending: EMERGENCY MEDICINE | Admitting: OBSTETRICS & GYNECOLOGY

## 2020-02-19 DIAGNOSIS — R55 NEAR SYNCOPE: ICD-10-CM

## 2020-02-19 DIAGNOSIS — D62 ACUTE BLOOD LOSS ANEMIA: ICD-10-CM

## 2020-02-19 DIAGNOSIS — N93.8 DYSFUNCTIONAL UTERINE BLEEDING: Primary | ICD-10-CM

## 2020-02-19 DIAGNOSIS — I95.1 ORTHOSTATIC HYPOTENSION: ICD-10-CM

## 2020-02-19 LAB
ALBUMIN SERPL-MCNC: 4.4 G/DL (ref 3.5–5.2)
ALBUMIN/GLOB SERPL: 2 G/DL
ALP SERPL-CCNC: 35 U/L (ref 39–117)
ALT SERPL W P-5'-P-CCNC: 6 U/L (ref 1–33)
ANION GAP SERPL CALCULATED.3IONS-SCNC: 10.9 MMOL/L (ref 5–15)
AST SERPL-CCNC: 10 U/L (ref 1–32)
BASOPHILS # BLD AUTO: 0.02 10*3/MM3 (ref 0–0.2)
BASOPHILS NFR BLD AUTO: 0.3 % (ref 0–1.5)
BILIRUB SERPL-MCNC: 0.3 MG/DL (ref 0.2–1.2)
BUN BLD-MCNC: 10 MG/DL (ref 6–20)
BUN/CREAT SERPL: 17.2 (ref 7–25)
CALCIUM SPEC-SCNC: 9.1 MG/DL (ref 8.6–10.5)
CHLORIDE SERPL-SCNC: 100 MMOL/L (ref 98–107)
CO2 SERPL-SCNC: 26.1 MMOL/L (ref 22–29)
CREAT BLD-MCNC: 0.58 MG/DL (ref 0.57–1)
DEPRECATED RDW RBC AUTO: 39.9 FL (ref 37–54)
EOSINOPHIL # BLD AUTO: 0.07 10*3/MM3 (ref 0–0.4)
EOSINOPHIL NFR BLD AUTO: 1.2 % (ref 0.3–6.2)
ERYTHROCYTE [DISTWIDTH] IN BLOOD BY AUTOMATED COUNT: 12.3 % (ref 12.3–15.4)
GFR SERPL CREATININE-BSD FRML MDRD: 118 ML/MIN/1.73
GLOBULIN UR ELPH-MCNC: 2.2 GM/DL
GLUCOSE BLD-MCNC: 111 MG/DL (ref 65–99)
HCG SERPL QL: NEGATIVE
HCT VFR BLD AUTO: 30.2 % (ref 34–46.6)
HGB BLD-MCNC: 10.3 G/DL (ref 12–15.9)
HOLD SPECIMEN: NORMAL
HOLD SPECIMEN: NORMAL
IMM GRANULOCYTES # BLD AUTO: 0.02 10*3/MM3 (ref 0–0.05)
IMM GRANULOCYTES NFR BLD AUTO: 0.3 % (ref 0–0.5)
LYMPHOCYTES # BLD AUTO: 1.14 10*3/MM3 (ref 0.7–3.1)
LYMPHOCYTES NFR BLD AUTO: 19.2 % (ref 19.6–45.3)
MCH RBC QN AUTO: 30.2 PG (ref 26.6–33)
MCHC RBC AUTO-ENTMCNC: 34.1 G/DL (ref 31.5–35.7)
MCV RBC AUTO: 88.6 FL (ref 79–97)
MONOCYTES # BLD AUTO: 0.31 10*3/MM3 (ref 0.1–0.9)
MONOCYTES NFR BLD AUTO: 5.2 % (ref 5–12)
NEUTROPHILS # BLD AUTO: 4.38 10*3/MM3 (ref 1.7–7)
NEUTROPHILS NFR BLD AUTO: 73.8 % (ref 42.7–76)
NRBC BLD AUTO-RTO: 0 /100 WBC (ref 0–0.2)
PLATELET # BLD AUTO: 213 10*3/MM3 (ref 140–450)
PMV BLD AUTO: 10.2 FL (ref 6–12)
POTASSIUM BLD-SCNC: 4 MMOL/L (ref 3.5–5.2)
PROT SERPL-MCNC: 6.6 G/DL (ref 6–8.5)
RBC # BLD AUTO: 3.41 10*6/MM3 (ref 3.77–5.28)
SODIUM BLD-SCNC: 137 MMOL/L (ref 136–145)
WBC NRBC COR # BLD: 5.94 10*3/MM3 (ref 3.4–10.8)
WHOLE BLOOD HOLD SPECIMEN: NORMAL
WHOLE BLOOD HOLD SPECIMEN: NORMAL

## 2020-02-19 PROCEDURE — 93010 ELECTROCARDIOGRAM REPORT: CPT | Performed by: INTERNAL MEDICINE

## 2020-02-19 PROCEDURE — 93005 ELECTROCARDIOGRAM TRACING: CPT | Performed by: EMERGENCY MEDICINE

## 2020-02-19 PROCEDURE — G0378 HOSPITAL OBSERVATION PER HR: HCPCS

## 2020-02-19 PROCEDURE — 25010000002 ESTROGENS CONJUGATED PER 25 MG: Performed by: EMERGENCY MEDICINE

## 2020-02-19 PROCEDURE — 85025 COMPLETE CBC W/AUTO DIFF WBC: CPT

## 2020-02-19 PROCEDURE — 99219 PR INITIAL OBSERVATION CARE/DAY 50 MINUTES: CPT | Performed by: OBSTETRICS & GYNECOLOGY

## 2020-02-19 PROCEDURE — 99285 EMERGENCY DEPT VISIT HI MDM: CPT

## 2020-02-19 PROCEDURE — 84703 CHORIONIC GONADOTROPIN ASSAY: CPT | Performed by: EMERGENCY MEDICINE

## 2020-02-19 PROCEDURE — 96374 THER/PROPH/DIAG INJ IV PUSH: CPT

## 2020-02-19 PROCEDURE — 80053 COMPREHEN METABOLIC PANEL: CPT | Performed by: EMERGENCY MEDICINE

## 2020-02-19 RX ORDER — SODIUM CHLORIDE 0.9 % (FLUSH) 0.9 %
10 SYRINGE (ML) INJECTION AS NEEDED
Status: DISCONTINUED | OUTPATIENT
Start: 2020-02-19 | End: 2020-02-20

## 2020-02-19 RX ADMIN — CONJUGATED ESTROGENS 25 MG: 25 INJECTION, POWDER, LYOPHILIZED, FOR SOLUTION INTRAMUSCULAR; INTRAVENOUS at 20:48

## 2020-02-19 RX ADMIN — SODIUM CHLORIDE 1000 ML: 9 INJECTION, SOLUTION INTRAVENOUS at 19:28

## 2020-02-19 NOTE — ED TRIAGE NOTES
Pt to ED with c/o heavy vaginal bleeding with clots starting yesterday, and had a near syncope episode today.  Pt reports changing pads once an hour.  Pt reports calling GYN and told to come to ED.   [Irritable] : irritability [Difficulty with Sleep] : difficulty with sleep [Fever] : fever [Nasal Discharge] : nasal discharge [Nasal Congestion] : nasal congestion [Cough] : cough [Negative] : Genitourinary

## 2020-02-19 NOTE — ED TRIAGE NOTES
"Pt reports heavy bright red vaginal bleeding started 24 hours ago and since then she has had \"about seven clots the size of your fist\". Pt reports she had one near-syncopal episode today. Pt reports he last normal period was beginning of December which is abnormal for her to be irregular.   "

## 2020-02-20 ENCOUNTER — APPOINTMENT (OUTPATIENT)
Dept: ULTRASOUND IMAGING | Facility: HOSPITAL | Age: 35
End: 2020-02-20

## 2020-02-20 LAB
APTT PPP: 33.1 SECONDS (ref 22.7–35.4)
BASOPHILS # BLD AUTO: 0.03 10*3/MM3 (ref 0–0.2)
BASOPHILS NFR BLD AUTO: 0.6 % (ref 0–1.5)
DEPRECATED RDW RBC AUTO: 39 FL (ref 37–54)
DEPRECATED RDW RBC AUTO: 39.1 FL (ref 37–54)
EOSINOPHIL # BLD AUTO: 0.15 10*3/MM3 (ref 0–0.4)
EOSINOPHIL NFR BLD AUTO: 3.1 % (ref 0.3–6.2)
ERYTHROCYTE [DISTWIDTH] IN BLOOD BY AUTOMATED COUNT: 12.1 % (ref 12.3–15.4)
ERYTHROCYTE [DISTWIDTH] IN BLOOD BY AUTOMATED COUNT: 12.2 % (ref 12.3–15.4)
FIBRINOGEN PPP-MCNC: 247 MG/DL (ref 219–464)
GLUCOSE BLDC GLUCOMTR-MCNC: 107 MG/DL (ref 70–130)
HCT VFR BLD AUTO: 22.5 % (ref 34–46.6)
HCT VFR BLD AUTO: 23.7 % (ref 34–46.6)
HGB BLD-MCNC: 7.4 G/DL (ref 12–15.9)
HGB BLD-MCNC: 7.9 G/DL (ref 12–15.9)
INR PPP: 1.11 (ref 0.9–1.1)
LYMPHOCYTES # BLD AUTO: 1.9 10*3/MM3 (ref 0.7–3.1)
LYMPHOCYTES NFR BLD AUTO: 39.6 % (ref 19.6–45.3)
MCH RBC QN AUTO: 29.2 PG (ref 26.6–33)
MCH RBC QN AUTO: 29.6 PG (ref 26.6–33)
MCHC RBC AUTO-ENTMCNC: 32.9 G/DL (ref 31.5–35.7)
MCHC RBC AUTO-ENTMCNC: 33.3 G/DL (ref 31.5–35.7)
MCV RBC AUTO: 88.8 FL (ref 79–97)
MCV RBC AUTO: 88.9 FL (ref 79–97)
MONOCYTES # BLD AUTO: 0.38 10*3/MM3 (ref 0.1–0.9)
MONOCYTES NFR BLD AUTO: 7.9 % (ref 5–12)
NEUTROPHILS # BLD AUTO: 2.33 10*3/MM3 (ref 1.7–7)
NEUTROPHILS NFR BLD AUTO: 48.6 % (ref 42.7–76)
PLATELET # BLD AUTO: 154 10*3/MM3 (ref 140–450)
PLATELET # BLD AUTO: 188 10*3/MM3 (ref 140–450)
PMV BLD AUTO: 10.4 FL (ref 6–12)
PMV BLD AUTO: 10.8 FL (ref 6–12)
PROTHROMBIN TIME: 14 SECONDS (ref 11.7–14.2)
RBC # BLD AUTO: 2.53 10*6/MM3 (ref 3.77–5.28)
RBC # BLD AUTO: 2.67 10*6/MM3 (ref 3.77–5.28)
WBC NRBC COR # BLD: 4.8 10*3/MM3 (ref 3.4–10.8)
WBC NRBC COR # BLD: 4.87 10*3/MM3 (ref 3.4–10.8)

## 2020-02-20 PROCEDURE — 96361 HYDRATE IV INFUSION ADD-ON: CPT

## 2020-02-20 PROCEDURE — 82962 GLUCOSE BLOOD TEST: CPT

## 2020-02-20 PROCEDURE — 63710000001 ONDANSETRON PER 8 MG: Performed by: OBSTETRICS & GYNECOLOGY

## 2020-02-20 PROCEDURE — 99225 PR SBSQ OBSERVATION CARE/DAY 25 MINUTES: CPT | Performed by: OBSTETRICS & GYNECOLOGY

## 2020-02-20 PROCEDURE — 85027 COMPLETE CBC AUTOMATED: CPT | Performed by: OBSTETRICS & GYNECOLOGY

## 2020-02-20 PROCEDURE — 76856 US EXAM PELVIC COMPLETE: CPT

## 2020-02-20 PROCEDURE — 85730 THROMBOPLASTIN TIME PARTIAL: CPT | Performed by: OBSTETRICS & GYNECOLOGY

## 2020-02-20 PROCEDURE — 85384 FIBRINOGEN ACTIVITY: CPT | Performed by: OBSTETRICS & GYNECOLOGY

## 2020-02-20 PROCEDURE — G0378 HOSPITAL OBSERVATION PER HR: HCPCS

## 2020-02-20 PROCEDURE — 76830 TRANSVAGINAL US NON-OB: CPT

## 2020-02-20 PROCEDURE — 85610 PROTHROMBIN TIME: CPT | Performed by: OBSTETRICS & GYNECOLOGY

## 2020-02-20 RX ORDER — ONDANSETRON 4 MG/1
4 TABLET, FILM COATED ORAL EVERY 6 HOURS PRN
Status: DISCONTINUED | OUTPATIENT
Start: 2020-02-20 | End: 2020-02-22 | Stop reason: HOSPADM

## 2020-02-20 RX ORDER — SODIUM CHLORIDE 0.9 % (FLUSH) 0.9 %
10 SYRINGE (ML) INJECTION EVERY 12 HOURS SCHEDULED
Status: DISCONTINUED | OUTPATIENT
Start: 2020-02-20 | End: 2020-02-22 | Stop reason: HOSPADM

## 2020-02-20 RX ORDER — ACETAMINOPHEN 325 MG/1
650 TABLET ORAL EVERY 4 HOURS PRN
Status: DISCONTINUED | OUTPATIENT
Start: 2020-02-20 | End: 2020-02-22 | Stop reason: HOSPADM

## 2020-02-20 RX ORDER — SODIUM CHLORIDE 0.9 % (FLUSH) 0.9 %
10 SYRINGE (ML) INJECTION AS NEEDED
Status: DISCONTINUED | OUTPATIENT
Start: 2020-02-20 | End: 2020-02-22 | Stop reason: HOSPADM

## 2020-02-20 RX ORDER — SODIUM CHLORIDE, SODIUM LACTATE, POTASSIUM CHLORIDE, CALCIUM CHLORIDE 600; 310; 30; 20 MG/100ML; MG/100ML; MG/100ML; MG/100ML
125 INJECTION, SOLUTION INTRAVENOUS CONTINUOUS
Status: DISCONTINUED | OUTPATIENT
Start: 2020-02-20 | End: 2020-02-22 | Stop reason: HOSPADM

## 2020-02-20 RX ADMIN — SODIUM CHLORIDE, PRESERVATIVE FREE 10 ML: 5 INJECTION INTRAVENOUS at 08:00

## 2020-02-20 RX ADMIN — SODIUM CHLORIDE, POTASSIUM CHLORIDE, SODIUM LACTATE AND CALCIUM CHLORIDE 125 ML/HR: 600; 310; 30; 20 INJECTION, SOLUTION INTRAVENOUS at 00:18

## 2020-02-20 RX ADMIN — SODIUM CHLORIDE, POTASSIUM CHLORIDE, SODIUM LACTATE AND CALCIUM CHLORIDE 125 ML/HR: 600; 310; 30; 20 INJECTION, SOLUTION INTRAVENOUS at 19:16

## 2020-02-20 RX ADMIN — SODIUM CHLORIDE, PRESERVATIVE FREE 10 ML: 5 INJECTION INTRAVENOUS at 00:18

## 2020-02-20 RX ADMIN — SODIUM CHLORIDE, POTASSIUM CHLORIDE, SODIUM LACTATE AND CALCIUM CHLORIDE 125 ML/HR: 600; 310; 30; 20 INJECTION, SOLUTION INTRAVENOUS at 11:23

## 2020-02-20 RX ADMIN — ACETAMINOPHEN 650 MG: 325 TABLET, FILM COATED ORAL at 00:24

## 2020-02-20 RX ADMIN — ACETAMINOPHEN 650 MG: 325 TABLET, FILM COATED ORAL at 12:18

## 2020-02-20 RX ADMIN — ONDANSETRON HYDROCHLORIDE 4 MG: 4 TABLET, FILM COATED ORAL at 04:44

## 2020-02-20 NOTE — PLAN OF CARE
PATIENT ADMITTED TONIGHT WITH BLEEDING AND NEAR SYNCOPAL EARLIER. NOW ALERT, V/S WNL, SOME CRAMPING AND DISCOMFORT IN HER LOWER ABDOMEN AND LOW BACK. COMPLEXION IS WNL AND WITHOUT PALLOR. NURSING WILL MONITOR FOR BLEEDING AND AMOUNTS.

## 2020-02-20 NOTE — H&P
Patient Care Team:  Vaishnavi Woo MD as PCP - General (Obstetrics)    Chief complaint Heavy menstrual bleeding    Subjective     Patient is a 34 y.o. female  who presented today to the ED with heavy vaginal bleeding that started yesterday.  She reports soaking more than a pad an hour for multiple hours and passing large clots.  She became very weak and dizzy and almost passed out.  She reports a history of regular cycles and is not currently using any contraception but is not trying to get pregnant.  She states she was diagnosed with diverticulitis in January and was on antibiotics for ten days and missed her period in January.  She started her cycle this month at the time she would expect to start it and it was extremely heavy.  UPT is negative.  She states that since being in the ED today, her bleeding has slowed down.  She was tachycardic on admission and hypotensive.  Hemoglobin was 10.3.  Vitals have improved with IV resuscitation.  Patient sees Dr. Woo at [facility] and last pap smear was one year ago and was NIL.    Review of Systems   Pertinent items are noted in HPI, all other systems reviewed and negative    History  Past Medical History:   Diagnosis Date   • Diverticulitis    • IBS (irritable bowel syndrome)      Past Surgical History:   Procedure Laterality Date   • LEEP  2009   • TONSILLECTOMY       Family History   Problem Relation Age of Onset   • Heart attack Father    • Cancer Maternal Grandmother         colon   • Colon cancer Maternal Grandmother    • Heart attack Paternal Grandfather      Social History     Tobacco Use   • Smoking status: Never Smoker   • Smokeless tobacco: Never Used   Substance Use Topics   • Alcohol use: Yes     Comment: socially   • Drug use: No       (Not in a hospital admission)  Allergies:  Codeine    Objective     Vital Signs  Temp:  [97.2 °F (36.2 °C)-98.6 °F (37 °C)] 97.2 °F (36.2 °C)  Heart Rate:  [] 81  Resp:  [14-17] 14  BP:  ()/(57-97) 102/57    Physical Exam:    General Appearance: alert, appears stated age and cooperative  Lungs: clear to auscultation, respirations regular, respirations even and respirations unlabored  Heart: regular rhythm & normal rate  Abdomen: normal bowel sounds, no masses, no hepatomegaly, no splenomegaly, soft non-tender, no guarding and no rebound tenderness  Pelvic: Exam deferred  Extremities: moves extremities well, no edema, no cyanosis and no redness  Skin: pale    Results Review:    I reviewed the patient's new clinical results.    Assessment/Plan       Dysfunctional uterine bleeding    34 year old female with episode of heavy vaginal bleeding and associated tachycardia and hypotension    Heavy vaginal bleeding--bleeding has slowed down since she presented to the ED and her vitals have responded well to IV resuscitation.  Orders given to give her 1 dose of IV estrogen.  This can be repeated in 4-6 hours if bleeding does not continue to slow down.  We will admit patient for observation overnight and repeat Hgb in am.  Pelvic ultrasound has also been ordered.  If bleeding continues to slow and is not heavy, she can likely be discharged tomorrow.    I discussed the patients findings and my recommendations with patient and .     Sangeeta Post MD  02/19/20  10:01 PM    Time: More than 50% of time spent in counseling and coordination of care:  Total face-to-face/floor time 50 min.  Time spent in counseling 30 min. Counseling included the following topics: plan of care

## 2020-02-20 NOTE — ED PROVIDER NOTES
EMERGENCY DEPARTMENT ENCOUNTER    Room Number:  34/34  PCP: Vaishnavi Woo MD  Historian: patient, family  History Limited By: nothing      HPI  Chief Complaint: near-syncope  Context: Cynthia Box is a 34 y.o. female who presents to the ED after a near-syncopal episode which occurred six hours ago at work. Patient also complains of vaginal bleeding that has been ongoing for one day. She thought it was her period at first because the bleeding was minimal. Starting at 1800 last night, she has been bleeding through super tampons and her clothing (one tampon per hour). She is passing 'fist-sized' clots. The bleeding slowed this morning but is still present. LMP mid-December 2019. Patient was diagnosed with diverticulits two months ago. She does not take birth control. Patient is complaining of suprapubic abdominal pain but denies vomiting, diarrhea, chest pain and all other complaints.     Location: general  Radiation: n/a  Character: near-syncope  Duration: less than one minute  Severity: moderate  Progression: resolved  Aggravating Factors: none  Alleviating Factors: none      PAST MEDICAL HISTORY  Active Ambulatory Problems     Diagnosis Date Noted   • Abnormal CT scan, colon 01/16/2020     Resolved Ambulatory Problems     Diagnosis Date Noted   • No Resolved Ambulatory Problems     Past Medical History:   Diagnosis Date   • Diverticulitis    • IBS (irritable bowel syndrome)          PAST SURGICAL HISTORY  Past Surgical History:   Procedure Laterality Date   • LEEP  2009   • TONSILLECTOMY           FAMILY HISTORY  Family History   Problem Relation Age of Onset   • Heart attack Father    • Cancer Maternal Grandmother         colon   • Colon cancer Maternal Grandmother    • Heart attack Paternal Grandfather          SOCIAL HISTORY  Social History     Socioeconomic History   • Marital status:      Spouse name: Not on file   • Number of children: 1   • Years of education: Not on file   • Highest  education level: Not on file   Occupational History   • Occupation:    Tobacco Use   • Smoking status: Never Smoker   • Smokeless tobacco: Never Used   Substance and Sexual Activity   • Alcohol use: Yes     Comment: socially   • Drug use: No   • Sexual activity: Defer         ALLERGIES  Codeine        REVIEW OF SYSTEMS  Review of Systems   Constitutional: Negative for fever.   HENT: Negative for sore throat.    Eyes: Negative.    Respiratory: Negative for cough and shortness of breath.    Cardiovascular: Negative for chest pain.   Gastrointestinal: Positive for abdominal pain (suprapubic). Negative for diarrhea and vomiting.   Genitourinary: Positive for vaginal bleeding. Negative for dysuria.   Musculoskeletal: Negative for neck pain.   Skin: Negative for rash.   Allergic/Immunologic: Negative.    Neurological: Positive for syncope. Negative for weakness, numbness and headaches.   Hematological: Negative.    Psychiatric/Behavioral: Negative.    All other systems reviewed and are negative.           PHYSICAL EXAM  ED Triage Vitals   Temp Heart Rate Resp BP SpO2   02/19/20 1546 02/19/20 1546 02/19/20 1546 02/19/20 1623 02/19/20 1546   98.6 °F (37 °C) (!) 127 17 105/69 100 %       Physical Exam   Constitutional: She is oriented to person, place, and time and well-developed, well-nourished, and in no distress. No distress.   HENT:   Head: Normocephalic and atraumatic.   Eyes: Pupils are equal, round, and reactive to light. EOM are normal.   Neck: Normal range of motion. Neck supple.   Cardiovascular: Regular rhythm and normal heart sounds. Tachycardia present.   Regular tachycardia   Pulmonary/Chest: Effort normal and breath sounds normal. No respiratory distress.   Abdominal: Soft. There is tenderness in the suprapubic area. There is no rebound and no guarding.   Mild suprapubic abdominal tenderness without rebound or guarding.   Genitourinary:   Genitourinary Comments: Moderate amount of bleeding on  pelvic exam.   Musculoskeletal: Normal range of motion. She exhibits no edema.   Neurological: She is alert and oriented to person, place, and time. She has normal sensation and normal strength.   Skin: Skin is warm and dry. No rash noted.   Psychiatric: Mood and affect normal.   Nursing note and vitals reviewed.          LAB RESULTS  Recent Results (from the past 24 hour(s))   Light Blue Top    Collection Time: 02/19/20  4:27 PM   Result Value Ref Range    Extra Tube hold for add-on    Green Top (Gel)    Collection Time: 02/19/20  4:27 PM   Result Value Ref Range    Extra Tube Hold for add-ons.    Lavender Top    Collection Time: 02/19/20  4:27 PM   Result Value Ref Range    Extra Tube hold for add-on    Gold Top - SST    Collection Time: 02/19/20  4:27 PM   Result Value Ref Range    Extra Tube Hold for add-ons.    CBC Auto Differential    Collection Time: 02/19/20  4:27 PM   Result Value Ref Range    WBC 5.94 3.40 - 10.80 10*3/mm3    RBC 3.41 (L) 3.77 - 5.28 10*6/mm3    Hemoglobin 10.3 (L) 12.0 - 15.9 g/dL    Hematocrit 30.2 (L) 34.0 - 46.6 %    MCV 88.6 79.0 - 97.0 fL    MCH 30.2 26.6 - 33.0 pg    MCHC 34.1 31.5 - 35.7 g/dL    RDW 12.3 12.3 - 15.4 %    RDW-SD 39.9 37.0 - 54.0 fl    MPV 10.2 6.0 - 12.0 fL    Platelets 213 140 - 450 10*3/mm3    Neutrophil % 73.8 42.7 - 76.0 %    Lymphocyte % 19.2 (L) 19.6 - 45.3 %    Monocyte % 5.2 5.0 - 12.0 %    Eosinophil % 1.2 0.3 - 6.2 %    Basophil % 0.3 0.0 - 1.5 %    Immature Grans % 0.3 0.0 - 0.5 %    Neutrophils, Absolute 4.38 1.70 - 7.00 10*3/mm3    Lymphocytes, Absolute 1.14 0.70 - 3.10 10*3/mm3    Monocytes, Absolute 0.31 0.10 - 0.90 10*3/mm3    Eosinophils, Absolute 0.07 0.00 - 0.40 10*3/mm3    Basophils, Absolute 0.02 0.00 - 0.20 10*3/mm3    Immature Grans, Absolute 0.02 0.00 - 0.05 10*3/mm3    nRBC 0.0 0.0 - 0.2 /100 WBC   hCG, Serum, Qualitative    Collection Time: 02/19/20  4:27 PM   Result Value Ref Range    HCG Qualitative Negative Negative   Comprehensive  Metabolic Panel    Collection Time: 02/19/20  4:27 PM   Result Value Ref Range    Glucose 111 (H) 65 - 99 mg/dL    BUN 10 6 - 20 mg/dL    Creatinine 0.58 0.57 - 1.00 mg/dL    Sodium 137 136 - 145 mmol/L    Potassium 4.0 3.5 - 5.2 mmol/L    Chloride 100 98 - 107 mmol/L    CO2 26.1 22.0 - 29.0 mmol/L    Calcium 9.1 8.6 - 10.5 mg/dL    Total Protein 6.6 6.0 - 8.5 g/dL    Albumin 4.40 3.50 - 5.20 g/dL    ALT (SGPT) 6 1 - 33 U/L    AST (SGOT) 10 1 - 32 U/L    Alkaline Phosphatase 35 (L) 39 - 117 U/L    Total Bilirubin 0.3 0.2 - 1.2 mg/dL    eGFR Non African Amer 118 >60 mL/min/1.73    Globulin 2.2 gm/dL    A/G Ratio 2.0 g/dL    BUN/Creatinine Ratio 17.2 7.0 - 25.0    Anion Gap 10.9 5.0 - 15.0 mmol/L       Ordered the above labs and reviewed the results.      PROCEDURES  Procedures      EKG:          EKG time: 2033  Rhythm/Rate: NSR 76  P waves and NV: normal  QRS, axis: normal   ST and T waves: normal     Interpreted Contemporaneously by me, independently viewed  No prior.      MEDICATIONS GIVEN IN ER  Medications   sodium chloride 0.9 % flush 10 mL (has no administration in time range)   conjugated estrogens (PREMARIN) injection 25 mg (has no administration in time range)   sodium chloride 0.9 % bolus 1,000 mL (1,000 mL Intravenous New Bag 2/19/20 1928)       PROGRESS AND CONSULTS  ED Course as of Feb 19 2046 Wed Feb 19, 2020 2022 8:22 PM  Patient here for dysfunctional uterine bleeding.  Has acute blood loss anemia as well as orthostatic hypotension.  Given fluids here.  Discussed with her doctor and then Dr. Post.  Will admit here.  IV estrogen.  No history of thromboembolic disease.      [SL]      ED Course User Index  [SL] Harlan Harmon MD      1600. Labs and EKG ordered.     1911. Informed patient of negative hCG and slight anemia seen on labs thus far. Discussed plan to check EKG, labs and pelvic exam. Pt understands and agrees with the plan, all questions answered.    1930. Patient is orthostatic.      1954. Performed pelvic exam. Female chaperone at bedside. Moderate amount of bleeding. Discussed plan to consult OB-GYN. Pt understands and agrees with the plan, all questions answered.    1957. Call out to [facility].    2010. Spoke with Dr Leon, OB-GYN, who does not want the patient transferred. She would like the on-call [facility] consulted.     2012. Call out to on-call OB-GYN.    2017. Spoke with Dr Post, OB-GYN, who would like estrogen ordered. She will admit the patient to Telemetry.    2021. Rechecked patient, vitals stable, patient is resting. Informed patient of consult with Dr Post, OB-GYN, and plan of admission. Pt understands and agrees with the plan, all questions answered.        2025. Ordered IV estrogen.       MEDICAL DECISION MAKING      MDM  Number of Diagnoses or Management Options  Anemia, unspecified type:   Vaginal bleeding:      Amount and/or Complexity of Data Reviewed  Clinical lab tests: reviewed and ordered (Hemoglobin 10.3)  Tests in the medicine section of CPT®: reviewed and ordered (See EKG procedure note.)  Discuss the patient with other providers: yes (Dr Post, OB-GYN)    Patient Progress  Patient progress: stable         DIAGNOSIS  Final diagnoses:   Dysfunctional uterine bleeding   Orthostatic hypotension   Acute blood loss anemia   Near syncope       DISPOSITION  ADMISSION    Discussed treatment plan and reason for admission with pt/family and admitting physician.  Pt/family voiced understanding of the plan for admission for further testing/treatment as needed.       Latest Documented Vital Signs:  As of 8:46 PM  BP- (!) 88/69 HR- (!) 131 Temp- 98.6 °F (37 °C) (Tympanic) O2 sat- 100%    --  Documentation assistance provided by ernesto Bales for Dr. Aleksandr Harmon MD.  Information recorded by the ernesto was done at my direction and has been verified and validated by me.       Sharon Bales  02/19/20 2046       Harlan Harmon MD  02/19/20 9206

## 2020-02-20 NOTE — PROGRESS NOTES
"GYN Progress Note     Subjective     Subjective       Chief Complaint   Patient presents with   • Syncope   • Vaginal Bleeding     HPI and interval hx.   34 y.o. who presented to ER with fainting episode. Reports having excessive menstrual bleeding - missed last cycle with diverticulitis episode, started about 1-2 days ago, became excessive and heavy - presented to ER. Given IV premarin times one.   Reports today her bleeding is more typical of her menstrual cycle and she does feel this is timed for her cycle and typical of this.   Does not have difficulty with ambulation or episodes of lightheadedness   Denies history of bruseing or bleeding more than average, bloody noses     Objective     Temp:  [97.2 °F (36.2 °C)-98.6 °F (37 °C)] 98.1 °F (36.7 °C)  Heart Rate:  [] 82  Resp:  [14-17] 16  BP: ()/(47-97) 88/47    Objective     Physical Exam:  GEN:  WN, WD female in NAD -       Lungs: normal   Abdomen: abdomen is soft without significant tenderness, masses, organomegaly or guarding.   Extremities:  extremities normal, atraumatic, no cyanosis or edema    No evidence of DVT      Lab Results   Component Value Date    WBC 4.80 02/20/2020    HGB 7.4 (L) 02/20/2020    HCT 22.5 (L) 02/20/2020    MCV 88.9 02/20/2020     02/20/2020         Assessment/Plan     1) Acute anemia from excessive menstrua bleeding.   Hg was 10 grams yesterday and down this AM to 7.4 grams   No significant symptoms on patient.   Ultrasound report pending.  Did review pictures myself and see thin EL but some voids consistent with clots in the lining   Recheck CBC this evening and in AM   Aware if drops too low, may have to consider transfusion.   As is acute her indices appear normal, so not sure IV iron would be of much use to her. Will check TIBC, ferritin, etc.     2) AUB (excessive menstrual bleeding) - feels back to more normal for her cycle. Not sure about using OCP taper or IV premarin at this point to \"control\" her bleeding " given no longer excessive and likely menstrual. Encouraged to report any change to us via RN and we could reconsider. Does not sound like she has a bleeding disorder given no prior issues, frequent nosebleeds etc. Will check PT/PTT, etc to be sure.                 Guzman Troy MD  02/20/20  11:45 AM

## 2020-02-20 NOTE — PROGRESS NOTES
Clinical Pharmacy Services: Medication History    Cynthia Box is a 34 y.o. female presenting to [facility] for   Chief Complaint   Patient presents with    Syncope    Vaginal Bleeding       She  has a past medical history of Diverticulitis and IBS (irritable bowel syndrome).    Allergies as of 02/19/2020 - Reviewed 02/19/2020   Allergen Reaction Noted    Codeine Hives 10/04/2012       Medication information was obtained from: discontinued the one medication that was on the list.  Pharmacy and Phone Number:     Prior to Admission Medications       None                Medication notes: patient no longer taking zofran.    This medication list is complete to the best of my knowledge as of 2/19/2020    Please call if questions.      2/19/2020 9:18 PM

## 2020-02-21 LAB
ABO GROUP BLD: NORMAL
BLD GP AB SCN SERPL QL: NEGATIVE
DEPRECATED RDW RBC AUTO: 41.5 FL (ref 37–54)
ERYTHROCYTE [DISTWIDTH] IN BLOOD BY AUTOMATED COUNT: 12.4 % (ref 12.3–15.4)
HCT VFR BLD AUTO: 22.2 % (ref 34–46.6)
HGB BLD-MCNC: 7.3 G/DL (ref 12–15.9)
IRON 24H UR-MRATE: 61 MCG/DL (ref 37–145)
IRON SATN MFR SERPL: 27 % (ref 20–50)
MCH RBC QN AUTO: 29.7 PG (ref 26.6–33)
MCHC RBC AUTO-ENTMCNC: 32.9 G/DL (ref 31.5–35.7)
MCV RBC AUTO: 90.2 FL (ref 79–97)
PLATELET # BLD AUTO: 129 10*3/MM3 (ref 140–450)
PMV BLD AUTO: 10.2 FL (ref 6–12)
RBC # BLD AUTO: 2.46 10*6/MM3 (ref 3.77–5.28)
RH BLD: POSITIVE
T&S EXPIRATION DATE: NORMAL
TIBC SERPL-MCNC: 228 MCG/DL (ref 298–536)
TRANSFERRIN SERPL-MCNC: 153 MG/DL (ref 200–360)
WBC NRBC COR # BLD: 4.68 10*3/MM3 (ref 3.4–10.8)

## 2020-02-21 PROCEDURE — 86900 BLOOD TYPING SEROLOGIC ABO: CPT | Performed by: OBSTETRICS & GYNECOLOGY

## 2020-02-21 PROCEDURE — G0378 HOSPITAL OBSERVATION PER HR: HCPCS

## 2020-02-21 PROCEDURE — 86900 BLOOD TYPING SEROLOGIC ABO: CPT

## 2020-02-21 PROCEDURE — 99225 PR SBSQ OBSERVATION CARE/DAY 25 MINUTES: CPT | Performed by: OBSTETRICS & GYNECOLOGY

## 2020-02-21 PROCEDURE — 96361 HYDRATE IV INFUSION ADD-ON: CPT

## 2020-02-21 PROCEDURE — 86901 BLOOD TYPING SEROLOGIC RH(D): CPT

## 2020-02-21 PROCEDURE — P9016 RBC LEUKOCYTES REDUCED: HCPCS

## 2020-02-21 PROCEDURE — 85027 COMPLETE CBC AUTOMATED: CPT | Performed by: OBSTETRICS & GYNECOLOGY

## 2020-02-21 PROCEDURE — 86923 COMPATIBILITY TEST ELECTRIC: CPT

## 2020-02-21 PROCEDURE — 83540 ASSAY OF IRON: CPT | Performed by: OBSTETRICS & GYNECOLOGY

## 2020-02-21 PROCEDURE — 84466 ASSAY OF TRANSFERRIN: CPT | Performed by: OBSTETRICS & GYNECOLOGY

## 2020-02-21 PROCEDURE — 86850 RBC ANTIBODY SCREEN: CPT | Performed by: OBSTETRICS & GYNECOLOGY

## 2020-02-21 PROCEDURE — 36430 TRANSFUSION BLD/BLD COMPNT: CPT

## 2020-02-21 PROCEDURE — 86901 BLOOD TYPING SEROLOGIC RH(D): CPT | Performed by: OBSTETRICS & GYNECOLOGY

## 2020-02-21 RX ORDER — IBUPROFEN 400 MG/1
600 TABLET ORAL EVERY 6 HOURS PRN
Status: DISCONTINUED | OUTPATIENT
Start: 2020-02-21 | End: 2020-02-22 | Stop reason: HOSPADM

## 2020-02-21 RX ORDER — IBUPROFEN 800 MG/1
800 TABLET ORAL EVERY 8 HOURS SCHEDULED
Status: DISCONTINUED | OUTPATIENT
Start: 2020-02-21 | End: 2020-02-21

## 2020-02-21 RX ORDER — IBUPROFEN 800 MG/1
800 TABLET ORAL EVERY 8 HOURS PRN
Status: DISCONTINUED | OUTPATIENT
Start: 2020-02-21 | End: 2020-02-22 | Stop reason: HOSPADM

## 2020-02-21 RX ADMIN — IBUPROFEN 800 MG: 800 TABLET, FILM COATED ORAL at 11:35

## 2020-02-21 RX ADMIN — SODIUM CHLORIDE, POTASSIUM CHLORIDE, SODIUM LACTATE AND CALCIUM CHLORIDE 125 ML/HR: 600; 310; 30; 20 INJECTION, SOLUTION INTRAVENOUS at 11:35

## 2020-02-21 RX ADMIN — IBUPROFEN 800 MG: 800 TABLET, FILM COATED ORAL at 22:29

## 2020-02-21 RX ADMIN — SODIUM CHLORIDE, POTASSIUM CHLORIDE, SODIUM LACTATE AND CALCIUM CHLORIDE 125 ML/HR: 600; 310; 30; 20 INJECTION, SOLUTION INTRAVENOUS at 03:27

## 2020-02-21 RX ADMIN — SODIUM CHLORIDE, PRESERVATIVE FREE 10 ML: 5 INJECTION INTRAVENOUS at 20:28

## 2020-02-21 NOTE — PROGRESS NOTES
Discharge Planning Assessment  [facility]     Patient Name: Cynthia Box  MRN: [id number]  Today's Date: 2/21/2020    Admit Date: 2/19/2020    Discharge Needs Assessment     Row Name 02/21/20 1441       Living Environment    Lives With  spouse    Current Living Arrangements  home/apartment/condo    Primary Care Provided by  self       Transition Planning    Patient/Family Anticipates Transition to  home with family    Transportation Anticipated  family or friend will provide       Discharge Needs Assessment    Concerns to be Addressed  no discharge needs identified    Anticipated Changes Related to Illness  none    Equipment Needed After Discharge  none    Provided post acute provider list?  N/A        Discharge Plan     Row Name 02/21/20 1441       Plan    Plan  Plans are home.     Plan Comments  CCP spoke with pt @ bedside, confirmed face sheet and primary OB as Dr. Woo.  Pt states her plans are home.  Pt is independent with ADL's,no needs anticipated.  CCP will follow.Rosa Almaguer RN        Destination      Coordination has not been started for this encounter.      Durable Medical Equipment      Coordination has not been started for this encounter.      Dialysis/Infusion      Coordination has not been started for this encounter.      Home Medical Care      Coordination has not been started for this encounter.      Therapy      Coordination has not been started for this encounter.      Community Resources      Coordination has not been started for this encounter.          Demographic Summary    No documentation.       Functional Status    No documentation.       Psychosocial    No documentation.       Abuse/Neglect    No documentation.       Legal    No documentation.       Substance Abuse    No documentation.       Patient Forms    No documentation.           Rosa Almaguer RN

## 2020-02-21 NOTE — PLAN OF CARE
Problem: Anemia (Adult)  Goal: Identify Related Risk Factors and Signs and Symptoms  Outcome: Outcome(s) achieved  Flowsheets (Taken 2/21/2020 9887)  Related Risk Factors (Anemia): bleeding  Signs and Symptoms (Anemia): fatigue   States vaginal bleeding slowing, no clots, denies abdominal pain. Took tylenol for headache with good relief. Rested well. Will continue to monitor.

## 2020-02-21 NOTE — PROGRESS NOTES
DAILY PROGRESS NOTE    Patient Identification:  Name: Cynthia Box  Age: 34 y.o.  Sex: female  :  1985  MRN: [id number]               Subjective:  Interval History: Patient's bleeding is much better today but is extremely exhausted and pale and having a severe headache.    Objective:    Scheduled Meds:  sodium chloride 10 mL Intravenous Q12H     Continuous Infusions:  lactated ringers 125 mL/hr Last Rate: 125 mL/hr (20 1135)     PRN Meds:•  acetaminophen  •  ibuprofen  •  ondansetron  •  sodium chloride    Vital signs in last 24 hours:  Temp:  [97.5 °F (36.4 °C)-98.1 °F (36.7 °C)] 97.5 °F (36.4 °C)  Heart Rate:  [75-90] 90  Resp:  [18] 18  BP: (93-98)/(51-57) 98/57    Intake/Output:    Intake/Output Summary (Last 24 hours) at 2020 1243  Last data filed at 2020 0327  Gross per 24 hour   Intake 1001 ml   Output --   Net 1001 ml       Exam:  General Appearance:    Alert, cooperative, no distress, appears stated age   Lungs:     Clear to auscultation bilaterally, respirations unlabored    Heart:    Regular rate and rhythm, S1 and S2 normal, no murmur, rub   or gallop   Abdomen:     Soft, non-tender, bowel sounds active all four quadrants,     no masses, no organomegaly   Extremities:   Extremities normal, atraumatic, no cyanosis or edema   Skin:   Skin color, texture, turgor normal, no rashes or lesions        Data Review:  Hemoglobin has dropped from 7.9 to 7.3.  Lab Results (last 24 hours)     Procedure Component Value Units Date/Time    Iron Profile [[id number]]  (Abnormal) Collected:  20    Specimen:  Blood Updated:  20     Iron 61 mcg/dL      Iron Saturation 27 %      Transferrin 153 mg/dL      TIBC 228 mcg/dL     CBC (No Diff) [[id number]]  (Abnormal) Collected:  20    Specimen:  Blood Updated:  20     WBC 4.68 10*3/mm3      RBC 2.46 10*6/mm3      Hemoglobin 7.3 g/dL      Hematocrit 22.2 %      MCV 90.2 fL      MCH 29.7 pg      MCHC 32.9 g/dL       RDW 12.4 %      RDW-SD 41.5 fl      MPV 10.2 fL      Platelets 129 10*3/mm3     Protime-INR [[id number]]  (Abnormal) Collected:  02/20/20 1745    Specimen:  Blood Updated:  02/20/20 1919     Protime 14.0 Seconds      INR 1.11    aPTT [[id number]]  (Normal) Collected:  02/20/20 1745    Specimen:  Blood Updated:  02/20/20 1919     PTT 33.1 seconds     Fibrinogen [[id number]]  (Normal) Collected:  02/20/20 1745    Specimen:  Blood Updated:  02/20/20 1919     Fibrinogen 247 mg/dL     CBC (No Diff) [[id number]]  (Abnormal) Collected:  02/20/20 1745    Specimen:  Blood Updated:  02/20/20 1909     WBC 4.87 10*3/mm3      RBC 2.67 10*6/mm3      Hemoglobin 7.9 g/dL      Hematocrit 23.7 %      MCV 88.8 fL      MCH 29.6 pg      MCHC 33.3 g/dL      RDW 12.1 %      RDW-SD 39.0 fl      MPV 10.8 fL      Platelets 188 10*3/mm3         Assessment:    Dysfunctional uterine bleeding    1.  Symptomatic anemia with hemoglobin dropped to 7.3.  2.  Improved vaginal bleeding    Plan:  1.  Long discussion with patient and family today about benefits versus possible complications and risks related to blood transfusion.  She is feeling poorly today and understands that if she goes home with a hemoglobin of 7 and has an episode of profuse bleeding she could end up in shock.  She does want a blood transfusion of 2 units today.  We also discussed possible discharge at least temporarily on OCPs.  We will see how patient is doing in the morning and hopefully she could be discharged after receiving blood.    Kyle Ramirez MD  2/21/2020

## 2020-02-21 NOTE — PROGRESS NOTES
Continued Stay Note  [facility]     Patient Name: Cynthia Box  MRN: [id number]  Today's Date: 2/21/2020    Admit Date: 2/19/2020    Discharge Plan     Row Name 02/21/20 1028       Plan    Plan Comments  attempted to screen pt for DC needs. Pt refused at this time stating she has a bad headache. CCP to follow............JW        Discharge Codes    No documentation.             Shannon Mckeon RN

## 2020-02-22 VITALS
OXYGEN SATURATION: 99 % | RESPIRATION RATE: 18 BRPM | HEART RATE: 78 BPM | DIASTOLIC BLOOD PRESSURE: 69 MMHG | SYSTOLIC BLOOD PRESSURE: 107 MMHG | BODY MASS INDEX: 23.22 KG/M2 | HEIGHT: 65 IN | TEMPERATURE: 97.6 F | WEIGHT: 139.4 LBS

## 2020-02-22 PROBLEM — D50.9 IRON (FE) DEFICIENCY ANEMIA: Status: ACTIVE | Noted: 2020-02-22

## 2020-02-22 LAB
ABO + RH BLD: NORMAL
ABO + RH BLD: NORMAL
BASOPHILS # BLD AUTO: 0.02 10*3/MM3 (ref 0–0.2)
BASOPHILS NFR BLD AUTO: 0.5 % (ref 0–1.5)
BH BB BLOOD EXPIRATION DATE: NORMAL
BH BB BLOOD EXPIRATION DATE: NORMAL
BH BB BLOOD TYPE BARCODE: 5100
BH BB BLOOD TYPE BARCODE: 5100
BH BB DISPENSE STATUS: NORMAL
BH BB DISPENSE STATUS: NORMAL
BH BB PRODUCT CODE: NORMAL
BH BB PRODUCT CODE: NORMAL
BH BB UNIT NUMBER: NORMAL
BH BB UNIT NUMBER: NORMAL
DEPRECATED RDW RBC AUTO: 40.6 FL (ref 37–54)
EOSINOPHIL # BLD AUTO: 0.18 10*3/MM3 (ref 0–0.4)
EOSINOPHIL NFR BLD AUTO: 4.3 % (ref 0.3–6.2)
ERYTHROCYTE [DISTWIDTH] IN BLOOD BY AUTOMATED COUNT: 12.6 % (ref 12.3–15.4)
HCT VFR BLD AUTO: 27.2 % (ref 34–46.6)
HGB BLD-MCNC: 9.3 G/DL (ref 12–15.9)
IMM GRANULOCYTES # BLD AUTO: 0.01 10*3/MM3 (ref 0–0.05)
IMM GRANULOCYTES NFR BLD AUTO: 0.2 % (ref 0–0.5)
LYMPHOCYTES # BLD AUTO: 1.72 10*3/MM3 (ref 0.7–3.1)
LYMPHOCYTES NFR BLD AUTO: 41.1 % (ref 19.6–45.3)
MCH RBC QN AUTO: 30.3 PG (ref 26.6–33)
MCHC RBC AUTO-ENTMCNC: 34.2 G/DL (ref 31.5–35.7)
MCV RBC AUTO: 88.6 FL (ref 79–97)
MONOCYTES # BLD AUTO: 0.34 10*3/MM3 (ref 0.1–0.9)
MONOCYTES NFR BLD AUTO: 8.1 % (ref 5–12)
NEUTROPHILS # BLD AUTO: 1.92 10*3/MM3 (ref 1.7–7)
NEUTROPHILS NFR BLD AUTO: 45.8 % (ref 42.7–76)
NRBC BLD AUTO-RTO: 0 /100 WBC (ref 0–0.2)
PLATELET # BLD AUTO: 125 10*3/MM3 (ref 140–450)
PMV BLD AUTO: 10.6 FL (ref 6–12)
RBC # BLD AUTO: 3.07 10*6/MM3 (ref 3.77–5.28)
UNIT  ABO: NORMAL
UNIT  ABO: NORMAL
UNIT  RH: NORMAL
UNIT  RH: NORMAL
WBC NRBC COR # BLD: 4.19 10*3/MM3 (ref 3.4–10.8)

## 2020-02-22 PROCEDURE — 99217 PR OBSERVATION CARE DISCHARGE MANAGEMENT: CPT | Performed by: OBSTETRICS & GYNECOLOGY

## 2020-02-22 PROCEDURE — 96361 HYDRATE IV INFUSION ADD-ON: CPT

## 2020-02-22 PROCEDURE — G0378 HOSPITAL OBSERVATION PER HR: HCPCS

## 2020-02-22 PROCEDURE — 85025 COMPLETE CBC W/AUTO DIFF WBC: CPT | Performed by: OBSTETRICS & GYNECOLOGY

## 2020-02-22 RX ADMIN — IBUPROFEN 800 MG: 800 TABLET, FILM COATED ORAL at 09:26

## 2020-02-22 RX ADMIN — SODIUM CHLORIDE, POTASSIUM CHLORIDE, SODIUM LACTATE AND CALCIUM CHLORIDE 125 ML/HR: 600; 310; 30; 20 INJECTION, SOLUTION INTRAVENOUS at 04:29

## 2020-02-22 NOTE — PLAN OF CARE
Problem: Patient Care Overview  Goal: Plan of Care Review  Outcome: Ongoing (interventions implemented as appropriate)  Flowsheets  Taken 2/22/2020 0402  Progress: improving  Taken 2/21/2020 2012  Plan of Care Reviewed With: patient;mother  Note:   Pt received 2 units PRBC yesterday/last night; reports feeling better, asymptomatic, up ad akil without difficulty; scant amt of brown to pink/red vaginal bleeding noted on sanitary pad x1 and with wiping; urine clear/ yellow, no further vaginal bleeding voiced with wiping nor on pad; VSS; NSR; plan for d/c home today pending H&H levels.     Problem: Anemia (Adult)  Goal: Symptom Improvement  Outcome: Ongoing (interventions implemented as appropriate)  Flowsheets (Taken 2/22/2020 0402)  Symptom Improvement: making progress toward outcome     Problem: Pain, Acute (Adult)  Goal: Acceptable Pain Control/Comfort Level  Outcome: Ongoing (interventions implemented as appropriate)  Flowsheets (Taken 2/22/2020 0402)  Acceptable Pain Control/Comfort Level: making progress toward outcome

## 2020-02-22 NOTE — DISCHARGE SUMMARY
Date of Discharge:  2/22/2020    Discharge Diagnosis: Menorrhagia with anemia    Presenting Problem/History of Present Illness  Active Hospital Problems    Diagnosis  POA   • Iron (Fe) deficiency anemia [D50.9]  Yes   • Dysfunctional uterine bleeding [N93.8]  Yes      Resolved Hospital Problems   No resolved problems to display.          Hospital Course  Patient is a 34 y.o. female presented with heavy vaginal bleeding.  She had had amenorrhea in January and began a menstrual cycle which consisted of abnormally heavy flow with clots.  She became symptomatic with dizziness and tachycardia.  The bleeding abated however her hemoglobin drifted down to 7 g.  She was symptomatic and received 2 units of packed red blood cells with excellent results.  Posttransfusion hemoglobin was 9.3 g.  At the time of discharge her vaginal bleeding had subsided and she was tolerating a regular diet and her anemia was asymptomatic.  The patient declined initiation of OCP therapy.  She will start over-the-counter iron and follow-up with her regular gynecologist in 2 weeks    Procedures Performed         Consults:   Consults     Date and Time Order Name Status Description    2/19/2020 2012 OB/GYN (on-call MD unless specified) Completed     2/19/2020 1957 OB/GYN (on-call MD unless specified) Completed           Pertinent Test Results: labs: Hgb 9.3 post-transfusion.  UCG neg    Condition on Discharge:  stable    Vital Signs  Temp:  [97.6 °F (36.4 °C)-98.7 °F (37.1 °C)] 97.6 °F (36.4 °C)  Heart Rate:  [74-80] 78  Resp:  [16-18] 18  BP: ()/(56-72) 107/69    Physical Exam:   The patient is alert and conversant and in no acute distress.  Vital signs are stable.  Abdomen is soft, flat and nontender.  No masses were palpable.  There is no blood on her pad.  Extremities without swelling or tenderness.    Discharge Disposition  Home or Self Care    Discharge Medications     Discharge Medications      Patient Not Prescribed Medications Upon  Discharge         Discharge Diet:   Diet Instructions     Diet: Regular      Discharge Diet:  Regular          Activity at Discharge:   Activity Instructions     Activity as Tolerated            Follow-up Appointments  Future Appointments   Date Time Provider Department Center   6/1/2020 11:30 AM [facility] 2  [facility]     Additional Instructions for the Follow-ups that You Need to Schedule     Discharge Follow-up with Specified Provider: Dr Woo; 2 Weeks   As directed      To:  Dr Woo    Follow Up:  2 Weeks               Test Results Pending at Discharge       Selvin Quick MD  02/22/20  12:13 PM    Time: Discharge 25 min

## 2020-02-24 NOTE — PROGRESS NOTES
Case Management Discharge Note      Final Note: Patient DC'd home    Provided post acute provider list?: N/A         Transportation Services  Private: Car    Final Discharge Disposition Code: 01 - home or self-care

## 2020-04-13 ENCOUNTER — TELEPHONE (OUTPATIENT)
Dept: GASTROENTEROLOGY | Facility: CLINIC | Age: 35
End: 2020-04-13

## 2020-04-14 ENCOUNTER — TELEPHONE (OUTPATIENT)
Dept: GASTROENTEROLOGY | Facility: CLINIC | Age: 35
End: 2020-04-14

## 2020-06-01 ENCOUNTER — APPOINTMENT (OUTPATIENT)
Dept: CT IMAGING | Facility: HOSPITAL | Age: 35
End: 2020-06-01

## 2020-08-07 ENCOUNTER — TELEPHONE (OUTPATIENT)
Dept: GASTROENTEROLOGY | Facility: CLINIC | Age: 35
End: 2020-08-07

## 2020-08-07 DIAGNOSIS — R93.3 ABNORMAL CT SCAN, COLON: Primary | ICD-10-CM

## 2020-08-07 NOTE — TELEPHONE ENCOUNTER
----- Message from Sally Klein sent at 8/6/2020  3:36 PM EDT -----  Regarding: new order   Contact: [phone]  Pt called stating that she needs a new order for her CT scan can you please look at this thank you

## 2020-08-07 NOTE — TELEPHONE ENCOUNTER
See note of 20 and CT order of same date.     Call to [facility] and spoke with Teresa.  States above order  .  Need new order.     CT order placed - message to DR Samuels.     VM to pt - advise message has been received.

## 2020-08-20 ENCOUNTER — APPOINTMENT (OUTPATIENT)
Dept: CT IMAGING | Facility: HOSPITAL | Age: 35
End: 2020-08-20

## 2020-08-25 ENCOUNTER — TRANSCRIBE ORDERS (OUTPATIENT)
Dept: SLEEP MEDICINE | Facility: HOSPITAL | Age: 35
End: 2020-08-25

## 2020-08-25 DIAGNOSIS — Z01.818 OTHER SPECIFIED PRE-OPERATIVE EXAMINATION: Primary | ICD-10-CM

## 2020-08-27 ENCOUNTER — APPOINTMENT (OUTPATIENT)
Dept: CT IMAGING | Facility: HOSPITAL | Age: 35
End: 2020-08-27

## 2020-08-28 ENCOUNTER — LAB (OUTPATIENT)
Dept: LAB | Facility: HOSPITAL | Age: 35
End: 2020-08-28

## 2020-08-28 DIAGNOSIS — Z01.818 OTHER SPECIFIED PRE-OPERATIVE EXAMINATION: ICD-10-CM

## 2020-08-28 PROCEDURE — C9803 HOPD COVID-19 SPEC COLLECT: HCPCS

## 2020-08-28 PROCEDURE — U0004 COV-19 TEST NON-CDC HGH THRU: HCPCS

## 2020-08-29 LAB
REF LAB TEST METHOD: NORMAL
SARS-COV-2 RNA RESP QL NAA+PROBE: NOT DETECTED

## 2020-08-31 ENCOUNTER — ANESTHESIA EVENT (OUTPATIENT)
Dept: GASTROENTEROLOGY | Facility: HOSPITAL | Age: 35
End: 2020-08-31

## 2020-08-31 ENCOUNTER — HOSPITAL ENCOUNTER (OUTPATIENT)
Facility: HOSPITAL | Age: 35
Setting detail: HOSPITAL OUTPATIENT SURGERY
Discharge: HOME | End: 2020-08-31
Attending: INTERNAL MEDICINE | Admitting: INTERNAL MEDICINE

## 2020-08-31 ENCOUNTER — ANESTHESIA (OUTPATIENT)
Dept: GASTROENTEROLOGY | Facility: HOSPITAL | Age: 35
End: 2020-08-31

## 2020-08-31 VITALS
HEART RATE: 62 BPM | RESPIRATION RATE: 17 BRPM | HEIGHT: 65 IN | BODY MASS INDEX: 21.99 KG/M2 | DIASTOLIC BLOOD PRESSURE: 70 MMHG | WEIGHT: 132 LBS | OXYGEN SATURATION: 98 % | SYSTOLIC BLOOD PRESSURE: 108 MMHG

## 2020-08-31 DIAGNOSIS — R93.3 ABNORMAL CT SCAN, COLON: ICD-10-CM

## 2020-08-31 LAB
B-HCG UR QL: NEGATIVE
INTERNAL NEGATIVE CONTROL: NEGATIVE
INTERNAL POSITIVE CONTROL: POSITIVE
Lab: NORMAL

## 2020-08-31 PROCEDURE — 25010000002 PROPOFOL 10 MG/ML EMULSION: Performed by: ANESTHESIOLOGY

## 2020-08-31 PROCEDURE — 81025 URINE PREGNANCY TEST: CPT | Performed by: INTERNAL MEDICINE

## 2020-08-31 PROCEDURE — 88305 TISSUE EXAM BY PATHOLOGIST: CPT | Performed by: INTERNAL MEDICINE

## 2020-08-31 PROCEDURE — 45380 COLONOSCOPY AND BIOPSY: CPT | Performed by: INTERNAL MEDICINE

## 2020-08-31 RX ORDER — SODIUM CHLORIDE, SODIUM LACTATE, POTASSIUM CHLORIDE, CALCIUM CHLORIDE 600; 310; 30; 20 MG/100ML; MG/100ML; MG/100ML; MG/100ML
1000 INJECTION, SOLUTION INTRAVENOUS CONTINUOUS
Status: DISCONTINUED | OUTPATIENT
Start: 2020-08-31 | End: 2020-08-31 | Stop reason: HOSPADM

## 2020-08-31 RX ORDER — SODIUM CHLORIDE 0.9 % (FLUSH) 0.9 %
10 SYRINGE (ML) INJECTION AS NEEDED
Status: DISCONTINUED | OUTPATIENT
Start: 2020-08-31 | End: 2020-08-31 | Stop reason: HOSPADM

## 2020-08-31 RX ORDER — LIDOCAINE HYDROCHLORIDE 10 MG/ML
0.5 INJECTION, SOLUTION INFILTRATION; PERINEURAL ONCE AS NEEDED
Status: DISCONTINUED | OUTPATIENT
Start: 2020-08-31 | End: 2020-08-31 | Stop reason: HOSPADM

## 2020-08-31 RX ORDER — PROPOFOL 10 MG/ML
VIAL (ML) INTRAVENOUS CONTINUOUS PRN
Status: DISCONTINUED | OUTPATIENT
Start: 2020-08-31 | End: 2020-08-31 | Stop reason: SURG

## 2020-08-31 RX ORDER — LIDOCAINE HYDROCHLORIDE 20 MG/ML
INJECTION, SOLUTION INFILTRATION; PERINEURAL AS NEEDED
Status: DISCONTINUED | OUTPATIENT
Start: 2020-08-31 | End: 2020-08-31 | Stop reason: SURG

## 2020-08-31 RX ORDER — PROPOFOL 10 MG/ML
VIAL (ML) INTRAVENOUS AS NEEDED
Status: DISCONTINUED | OUTPATIENT
Start: 2020-08-31 | End: 2020-08-31 | Stop reason: SURG

## 2020-08-31 RX ADMIN — LIDOCAINE HYDROCHLORIDE 50 MG: 20 INJECTION, SOLUTION INFILTRATION; PERINEURAL at 13:58

## 2020-08-31 RX ADMIN — SODIUM CHLORIDE, POTASSIUM CHLORIDE, SODIUM LACTATE AND CALCIUM CHLORIDE 1000 ML: 600; 310; 30; 20 INJECTION, SOLUTION INTRAVENOUS at 12:56

## 2020-08-31 RX ADMIN — PROPOFOL 150 MG: 10 INJECTION, EMULSION INTRAVENOUS at 13:58

## 2020-08-31 RX ADMIN — PROPOFOL 140 MCG/KG/MIN: 10 INJECTION, EMULSION INTRAVENOUS at 14:01

## 2020-08-31 RX ADMIN — PROPOFOL 50 MG: 10 INJECTION, EMULSION INTRAVENOUS at 14:04

## 2020-08-31 NOTE — ANESTHESIA POSTPROCEDURE EVALUATION
Patient: Cynthia Box    Procedure Summary     Date:  08/31/20 Room / Location:   [facility]    Anesthesia Start:  1354 Anesthesia Stop:  1427    Procedure:  COLONOSCOPY TO CECUM AND TERMINAL ILEUM WITH BIOPSIES WITH COLD BIOPSY POLYPECTOMY (N/A ) Diagnosis:       Abnormal CT scan, colon      (Abnormal CT scan, colon [R93.3])    Surgeon:  Julieta Samuels MD Provider:  Shelby Joseph MD    Anesthesia Type:  MAC ASA Status:  2          Anesthesia Type: MAC    Vitals  Vitals Value Taken Time   /70 8/31/2020  2:45 PM   Temp     Pulse 62 8/31/2020  2:45 PM   Resp 17 8/31/2020  2:45 PM   SpO2 98 % 8/31/2020  2:45 PM           Post Anesthesia Care and Evaluation    Patient location during evaluation: PACU  Patient participation: complete - patient participated  Level of consciousness: awake and alert  Pain management: adequate  Airway patency: patent  Anesthetic complications: No anesthetic complications  PONV Status: none  Cardiovascular status: acceptable  Respiratory status: acceptable  Hydration status: acceptable

## 2020-08-31 NOTE — ANESTHESIA PREPROCEDURE EVALUATION
Anesthesia Evaluation     Patient summary reviewed and Nursing notes reviewed   no history of anesthetic complications:  NPO Solid Status: > 8 hours  NPO Liquid Status: > 2 hours           Airway   Mallampati: II  TM distance: >3 FB  Neck ROM: full  No difficulty expected  Dental - normal exam     Pulmonary - negative pulmonary ROS and normal exam    breath sounds clear to auscultation  Cardiovascular - negative cardio ROS and normal exam    Rhythm: regular  Rate: normal        Neuro/Psych- negative ROS  GI/Hepatic/Renal/Endo      ROS Comment: Hx IBS, diverticulitis    Musculoskeletal (-) negative ROS    Abdominal  - normal exam   Substance History - negative use     OB/GYN negative ob/gyn ROS         Other - negative ROS                     Anesthesia Plan    ASA 2     MAC     intravenous induction     Anesthetic plan, all risks, benefits, and alternatives have been provided, discussed and informed consent has been obtained with: patient.       respiratory distress/well-nourished/well-groomed

## 2020-09-01 ENCOUNTER — TELEPHONE (OUTPATIENT)
Dept: GASTROENTEROLOGY | Facility: CLINIC | Age: 35
End: 2020-09-01

## 2020-09-01 LAB
CYTO UR: NORMAL
LAB AP CASE REPORT: NORMAL
PATH REPORT.FINAL DX SPEC: NORMAL
PATH REPORT.GROSS SPEC: NORMAL

## 2020-09-01 NOTE — TELEPHONE ENCOUNTER
Please let her know that no formation was seen on recent colon biopsies.  The polyp was benign.  Repeat colonoscopy for screening at age 43.  Recommend fiber supplementation daily such as Benefiber given diverticulosis noted on colonoscopy

## 2020-09-03 ENCOUNTER — TELEPHONE (OUTPATIENT)
Dept: GASTROENTEROLOGY | Facility: CLINIC | Age: 35
End: 2020-09-03

## 2020-09-03 DIAGNOSIS — R16.2 HEPATOSPLENOMEGALY: Primary | ICD-10-CM

## 2020-09-03 NOTE — TELEPHONE ENCOUNTER
----- Message from Yudith Cheng RN sent at 9/2/2020  1:06 PM EDT -----  Regarding: Peer to peer  Contact: [phone]      ----- Message -----  From: Kiana Barragan  Sent: 9/2/2020  11:01 AM EDT  To: [facility] 2 Pool    Press option 3  Tracking #[id number]    Pt scheduled for ct abd & pelvis and did not meet criteria. They need a peer to peer. Please call above # press option # 3 to do.

## 2020-09-03 NOTE — TELEPHONE ENCOUNTER
pls let pt know that we need to change CT to an u/s to f/u on abnormal appearing iver/spleen noted on CT - pls cancel CT and schedule u/s

## 2020-09-04 NOTE — TELEPHONE ENCOUNTER
Call to pt.  Advise per Dr Samuels note.      Asking if US necessary.  Advis eper note of 1/16 that imaging was in f/u of prior abn ct with hepatosplenomegaly with no obvious etiology.  Verb understanding.

## 2020-09-10 ENCOUNTER — TELEPHONE (OUTPATIENT)
Dept: GASTROENTEROLOGY | Facility: CLINIC | Age: 35
End: 2020-09-10

## 2020-09-10 ENCOUNTER — APPOINTMENT (OUTPATIENT)
Dept: CT IMAGING | Facility: HOSPITAL | Age: 35
End: 2020-09-10

## 2020-09-10 NOTE — TELEPHONE ENCOUNTER
----- Message from Cynthia Box sent at 9/10/2020  9:23 AM EDT -----  Regarding: Test Results Question  Contact: [phone]  Hi. I received the colonoscopy results that said no sign of colitis ( yay!)  am I also in the clear with no signs of Crohn’s disease?     Thank you

## 2020-09-16 ENCOUNTER — TELEPHONE (OUTPATIENT)
Dept: GASTROENTEROLOGY | Facility: CLINIC | Age: 35
End: 2020-09-16

## 2020-09-16 NOTE — TELEPHONE ENCOUNTER
----- Message from Lukas Lutz sent at 9/16/2020 11:02 AM EDT -----  Regarding: ct scan  Contact: [phone]  Marcela with [facility] is wanting to verify if the ct scan needs to be cancelled.

## 2020-09-16 NOTE — TELEPHONE ENCOUNTER
Called Marcela in [facility], [place] at [phone] and on identified vm advised that the ct scan can be cancelled because pt's insurance will not cover for it ( see call dated (09/03/2020). Advised can call [phone] with questions.

## 2020-09-18 ENCOUNTER — TELEPHONE (OUTPATIENT)
Dept: GASTROENTEROLOGY | Facility: CLINIC | Age: 35
End: 2020-09-18

## 2020-09-18 NOTE — TELEPHONE ENCOUNTER
----- Message from Lukas Ann Rep sent at 9/18/2020  8:11 AM EDT -----  Regarding: nurse calling  Contact: [phone]  Nurse Octavia needs to speak to someone about the order for pt [phone]

## 2020-09-18 NOTE — TELEPHONE ENCOUNTER
Called radiology and spoke with Octavia [phone].  She advised that the pt is scheduled for a us abd complete which includes abd and organ. She states they also have a us abd limited which includes liver and spleen. She was just make sure that Dr Samuels wants the abd complete us.  Advised will send message to Dr Samuels. Pt is scheduled for Monday 09/21.

## 2020-09-21 ENCOUNTER — APPOINTMENT (OUTPATIENT)
Dept: CT IMAGING | Facility: HOSPITAL | Age: 35
End: 2020-09-21

## 2020-09-21 ENCOUNTER — HOSPITAL ENCOUNTER (OUTPATIENT)
Dept: ULTRASOUND IMAGING | Facility: HOSPITAL | Age: 35
Discharge: HOME | End: 2020-09-21
Admitting: INTERNAL MEDICINE

## 2020-09-21 DIAGNOSIS — R16.2 HEPATOSPLENOMEGALY: ICD-10-CM

## 2020-09-21 PROCEDURE — 76705 ECHO EXAM OF ABDOMEN: CPT

## 2020-09-25 ENCOUNTER — TELEPHONE (OUTPATIENT)
Dept: GASTROENTEROLOGY | Facility: CLINIC | Age: 35
End: 2020-09-25

## 2020-09-25 DIAGNOSIS — R16.2 HEPATOSPLENOMEGALY: Primary | ICD-10-CM

## 2020-09-30 ENCOUNTER — RESULTS ENCOUNTER (OUTPATIENT)
Dept: GASTROENTEROLOGY | Facility: CLINIC | Age: 35
End: 2020-09-30

## 2020-09-30 DIAGNOSIS — R16.2 HEPATOSPLENOMEGALY: ICD-10-CM

## 2020-10-01 NOTE — TELEPHONE ENCOUNTER
Call to pt.  Advise per DR Samuels note.  Verb understanding.    Lab appt scheduled for 10/5 @ 1pm. (orders already in).

## 2020-10-05 ENCOUNTER — LAB (OUTPATIENT)
Dept: GASTROENTEROLOGY | Facility: CLINIC | Age: 35
End: 2020-10-05

## 2020-10-06 LAB
ALBUMIN SERPL-MCNC: 4.8 G/DL (ref 3.5–5.2)
ALP SERPL-CCNC: 40 U/L (ref 39–117)
ALT SERPL-CCNC: 15 U/L (ref 1–33)
ANA SER QL: NEGATIVE
AST SERPL-CCNC: 15 U/L (ref 1–32)
BASOPHILS # BLD AUTO: 0.04 10*3/MM3 (ref 0–0.2)
BASOPHILS NFR BLD AUTO: 0.9 % (ref 0–1.5)
BILIRUB DIRECT SERPL-MCNC: 0.2 MG/DL (ref 0–0.3)
BILIRUB SERPL-MCNC: 0.6 MG/DL (ref 0–1.2)
EOSINOPHIL # BLD AUTO: 0.16 10*3/MM3 (ref 0–0.4)
EOSINOPHIL NFR BLD AUTO: 3.6 % (ref 0.3–6.2)
ERYTHROCYTE [DISTWIDTH] IN BLOOD BY AUTOMATED COUNT: 12 % (ref 12.3–15.4)
HCT VFR BLD AUTO: 39.3 % (ref 34–46.6)
HGB BLD-MCNC: 13 G/DL (ref 12–15.9)
IMM GRANULOCYTES # BLD AUTO: 0.01 10*3/MM3 (ref 0–0.05)
IMM GRANULOCYTES NFR BLD AUTO: 0.2 % (ref 0–0.5)
LYMPHOCYTES # BLD AUTO: 1.59 10*3/MM3 (ref 0.7–3.1)
LYMPHOCYTES NFR BLD AUTO: 35.8 % (ref 19.6–45.3)
MCH RBC QN AUTO: 30.2 PG (ref 26.6–33)
MCHC RBC AUTO-ENTMCNC: 33.1 G/DL (ref 31.5–35.7)
MCV RBC AUTO: 91.4 FL (ref 79–97)
MONOCYTES # BLD AUTO: 0.49 10*3/MM3 (ref 0.1–0.9)
MONOCYTES NFR BLD AUTO: 11 % (ref 5–12)
NEUTROPHILS # BLD AUTO: 2.15 10*3/MM3 (ref 1.7–7)
NEUTROPHILS NFR BLD AUTO: 48.5 % (ref 42.7–76)
NRBC BLD AUTO-RTO: 0 /100 WBC (ref 0–0.2)
PLATELET # BLD AUTO: 185 10*3/MM3 (ref 140–450)
PROT SERPL-MCNC: 6.7 G/DL (ref 6–8.5)
RBC # BLD AUTO: 4.3 10*6/MM3 (ref 3.77–5.28)
WBC # BLD AUTO: 4.44 10*3/MM3 (ref 3.4–10.8)

## 2020-10-15 ENCOUNTER — TELEPHONE (OUTPATIENT)
Dept: GASTROENTEROLOGY | Facility: CLINIC | Age: 35
End: 2020-10-15

## 2020-10-15 DIAGNOSIS — R16.2 HEPATOSPLENOMEGALY: Primary | ICD-10-CM

## 2020-10-15 NOTE — TELEPHONE ENCOUNTER
Labs all normal which is very reassuring but does not explain the mild enlargement of liver and spleen.  It is very subtle and may not be significant but would like to keep an eye on her labs in the short term to make sure they remain stable - rec cbc, cmp 3 months

## 2020-10-16 NOTE — TELEPHONE ENCOUNTER
Called pt and advised of Dr Samuels's note.     Pt verb understanding.   Pt is asking if having the ct scan and us are enough to see her liver and spleen. She is asking if she needs a mri.  Advised will send message to Dr Samuels.     Lab appt made for 01/04/2021 at 11a.   Cbc and cmp ordered.

## 2020-10-20 ENCOUNTER — RESULTS ENCOUNTER (OUTPATIENT)
Dept: GASTROENTEROLOGY | Facility: CLINIC | Age: 35
End: 2020-10-20

## 2020-10-20 DIAGNOSIS — R16.2 HEPATOSPLENOMEGALY: ICD-10-CM

## 2020-11-09 ENCOUNTER — OFFICE VISIT (OUTPATIENT)
Dept: GASTROENTEROLOGY | Facility: CLINIC | Age: 35
End: 2020-11-09

## 2020-11-09 VITALS — HEIGHT: 65 IN | BODY MASS INDEX: 22.19 KG/M2 | WEIGHT: 133.2 LBS

## 2020-11-09 DIAGNOSIS — R16.0 HEPATOMEGALY: Primary | ICD-10-CM

## 2020-11-09 DIAGNOSIS — R16.1 SPLENOMEGALY: ICD-10-CM

## 2020-11-09 DIAGNOSIS — Z87.19 HISTORY OF DIVERTICULITIS: ICD-10-CM

## 2020-11-09 DIAGNOSIS — K63.5 HYPERPLASTIC COLONIC POLYP, UNSPECIFIED PART OF COLON: ICD-10-CM

## 2020-11-09 PROCEDURE — 99214 OFFICE O/P EST MOD 30 MIN: CPT | Performed by: NURSE PRACTITIONER

## 2020-11-09 RX ORDER — MELATONIN
1000 DAILY
COMMUNITY

## 2020-11-09 NOTE — PROGRESS NOTES
Chief Complaint   Patient presents with   • Discuss US ABD & Labs       Cynthia Box is a  35 y.o. female here for a follow up visit for abnormal CT scan.    HPI  35-year-old female presents today for follow-up visit for abnormal CT scan.  She is a patient of Dr. Samuels.  She was last seen in the office on 1/16/2020.  She did have a CT scan of the abdomen and pelvis done earlier this year that showed:     IMPRESSION:     1. Markedly thick walled appearance to the patient's cecum with adjacent  pericolonic soft tissue stranding and trace fluid. There is a  diverticulum within this area, and this may reflect diverticulitis,  although focal colitis, either infectious or inflammatory etiology, is  not excluded. Of note, the patient's appendix is normal. Short-term  follow-up exam to exclude any underlying lesion is suggested.  2. Hepatosplenomegaly, of uncertain clinical significance.     She then had a ultrasound of the abdomen done that showed:    IMPRESSION:  1. Mild splenomegaly  2. Borderline hepatomegaly    She does have a history of her platelets slightly being low dating back to 2017.  But here lately they have been normal.  White count is normal.  LFTs are normal.  She did undergo a colonoscopy in August with Dr. Samuels that showed diverticulosis and one hyperplastic colon polyp.  Otherwise it was unremarkable.  Patient admits since being treated for diverticulitis and now working with a nutritionist she does not have any GI symptoms.  She tells me her bowels move regularly every day.  She is no longer having any abdominal pain or cramping.  Overall she tells me she is feeling much better.  She is on a daily fiber supplement.  She denies any dysphagia, reflux, abdominal pain, nausea vomiting, diarrhea, constipation, to bleeding or melena.  She was appetite is good and her weight has dropped 15 pounds since January.  She does have a family history of colon cancer with her maternal grandmother.    Past Medical  History:   Diagnosis Date   • Diverticulitis    • IBS (irritable bowel syndrome)        Past Surgical History:   Procedure Laterality Date   • COLONOSCOPY N/A 8/31/2020    Procedure: COLONOSCOPY TO CECUM AND TERMINAL ILEUM WITH BIOPSIES WITH COLD BIOPSY POLYPECTOMY;  Surgeon: Julieta Samuels MD;  Location: [facility] ENDOSCOPY;  Service: Gastroenterology;  Laterality: N/A;  PRE- ABNORMAL CT  POST- DIVERTICULOSIS, COLON POLYP, HEMORRHOIDS   • LEEP  2009   • TONSILLECTOMY         Scheduled Meds:    Continuous Infusions:No current facility-administered medications for this visit.       PRN Meds:.    Allergies   Allergen Reactions   • Codeine Hives       Social History     Socioeconomic History   • Marital status:      Spouse name: Not on file   • Number of children: 1   • Years of education: Not on file   • Highest education level: Not on file   Occupational History   • Occupation:    Tobacco Use   • Smoking status: Never Smoker   • Smokeless tobacco: Never Used   Substance and Sexual Activity   • Alcohol use: Yes     Comment: socially   • Drug use: No   • Sexual activity: Defer       Family History   Problem Relation Age of Onset   • Heart attack Father    • Cancer Maternal Grandmother         colon   • Colon cancer Maternal Grandmother    • Heart attack Paternal Grandfather        Review of Systems   Constitutional: Negative for appetite change, chills, diaphoresis, fatigue, fever and unexpected weight change.   HENT: Negative for nosebleeds, postnasal drip, sore throat, trouble swallowing and voice change.    Respiratory: Negative for cough, choking, chest tightness, shortness of breath and wheezing.    Cardiovascular: Negative for chest pain, palpitations and leg swelling.   Gastrointestinal: Negative for abdominal distention, abdominal pain, anal bleeding, blood in stool, constipation, diarrhea, nausea, rectal pain and vomiting.   Endocrine: Negative for polydipsia, polyphagia and polyuria.    Musculoskeletal: Negative for gait problem.   Skin: Negative for rash and wound.   Allergic/Immunologic: Negative for food allergies.   Neurological: Negative for dizziness, speech difficulty and light-headedness.   Psychiatric/Behavioral: Negative for confusion, self-injury, sleep disturbance and suicidal ideas.       There were no vitals filed for this visit.    Physical Exam  Constitutional:       General: She is not in acute distress.     Appearance: She is well-developed. She is not ill-appearing.   HENT:      Head: Normocephalic.   Eyes:      Pupils: Pupils are equal, round, and reactive to light.   Cardiovascular:      Rate and Rhythm: Normal rate and regular rhythm.      Heart sounds: Normal heart sounds.   Pulmonary:      Effort: Pulmonary effort is normal.      Breath sounds: Normal breath sounds.   Abdominal:      General: Bowel sounds are normal. There is no distension.      Palpations: Abdomen is soft. There is no mass.      Tenderness: There is no abdominal tenderness. There is no guarding or rebound.      Hernia: No hernia is present.   Musculoskeletal: Normal range of motion.   Skin:     General: Skin is warm and dry.   Neurological:      Mental Status: She is alert and oriented to person, place, and time.   Psychiatric:         Speech: Speech normal.         Behavior: Behavior normal.         Judgment: Judgment normal.         No radiology results for the last 7 days     Assessment and plan     1. Hepatomegaly    2. Splenomegaly    3. Hyperplastic colonic polyp, unspecified part of colon    4. History of diverticulitis      Reviewed all of her labs and diagnostic testing with her today.  I also went over her colonoscopy results.  She had lots of questions and I tried to answer them the best I could.  Overall she seems to be doing really well right now.  Most recent labs look good.  LFTs are normal.  Will repeat labs again in January 2021.  Patient may consider referral to hematology due to her  history of intermittently low platelets.  Next screening colonoscopy will be at age 43.  Patient to call the office with any issues.  Patient to follow-up with me or Dr. Samuels in 3 months.

## 2020-12-10 ENCOUNTER — TELEPHONE (OUTPATIENT)
Dept: GASTROENTEROLOGY | Facility: CLINIC | Age: 35
End: 2020-12-10

## 2020-12-10 DIAGNOSIS — R16.2 HEPATOSPLENOMEGALY: Primary | ICD-10-CM

## 2020-12-10 NOTE — TELEPHONE ENCOUNTER
----- Message from Cynthia Box sent at 12/10/2020 10:27 AM EST -----  Regarding: Visit Follow-Up Question  Contact: [phone]  Hi. I have an appointment to have my blood drawn in January as a follow up for health issues I’ve had. I wanted to ask if I could also have some other tests run in addition to the tests I am having. Can I also have a CMP, lipid panel, Hemoglobin A1C, TSH, Ironprofilec, Vitamin D 25 hydroxy.   I would like the more comprehensive tests due to my previous blood transfusion, diverticulitis, spleen and liver issues, history of low vitamin D, and overall health issues.  Trying to be as healthy as I can and informed about what’s going on in my body.   Thank you so much!

## 2020-12-11 NOTE — TELEPHONE ENCOUNTER
VM to pt.  Per [employer] release, advise per МАРИЯ Young note.  Contact office if any questions.     Lab orders placed: lipid panel, Hemoglobin A1C, TSH, Ironprofilec, Vitamin D 25 hydroxy.  Message to МАРИЯ Young.

## 2020-12-18 ENCOUNTER — APPOINTMENT (OUTPATIENT)
Dept: CT IMAGING | Facility: HOSPITAL | Age: 35
End: 2020-12-18

## 2020-12-18 ENCOUNTER — TELEPHONE (OUTPATIENT)
Dept: GASTROENTEROLOGY | Facility: CLINIC | Age: 35
End: 2020-12-18

## 2020-12-18 ENCOUNTER — HOSPITAL ENCOUNTER (EMERGENCY)
Facility: HOSPITAL | Age: 35
Discharge: HOME | End: 2020-12-18
Attending: EMERGENCY MEDICINE | Admitting: EMERGENCY MEDICINE

## 2020-12-18 VITALS
HEART RATE: 81 BPM | DIASTOLIC BLOOD PRESSURE: 81 MMHG | WEIGHT: 130 LBS | RESPIRATION RATE: 16 BRPM | SYSTOLIC BLOOD PRESSURE: 116 MMHG | TEMPERATURE: 97.6 F | OXYGEN SATURATION: 98 % | HEIGHT: 65 IN | BODY MASS INDEX: 21.66 KG/M2

## 2020-12-18 DIAGNOSIS — K29.80 DUODENITIS: Primary | ICD-10-CM

## 2020-12-18 LAB
ALBUMIN SERPL-MCNC: 4.7 G/DL (ref 3.5–5.2)
ALBUMIN/GLOB SERPL: 1.8 G/DL
ALP SERPL-CCNC: 34 U/L (ref 39–117)
ALT SERPL W P-5'-P-CCNC: 17 U/L (ref 1–33)
ANION GAP SERPL CALCULATED.3IONS-SCNC: 6.2 MMOL/L (ref 5–15)
AST SERPL-CCNC: 16 U/L (ref 1–32)
BASOPHILS # BLD AUTO: 0.03 10*3/MM3 (ref 0–0.2)
BASOPHILS NFR BLD AUTO: 0.8 % (ref 0–1.5)
BILIRUB SERPL-MCNC: 0.7 MG/DL (ref 0–1.2)
BILIRUB UR QL STRIP: NEGATIVE
BUN SERPL-MCNC: 13 MG/DL (ref 6–20)
BUN/CREAT SERPL: 20.3 (ref 7–25)
CALCIUM SPEC-SCNC: 9.9 MG/DL (ref 8.6–10.5)
CHLORIDE SERPL-SCNC: 103 MMOL/L (ref 98–107)
CLARITY UR: CLEAR
CO2 SERPL-SCNC: 27.8 MMOL/L (ref 22–29)
COLOR UR: YELLOW
CREAT SERPL-MCNC: 0.64 MG/DL (ref 0.57–1)
DEPRECATED RDW RBC AUTO: 39.2 FL (ref 37–54)
EOSINOPHIL # BLD AUTO: 0.07 10*3/MM3 (ref 0–0.4)
EOSINOPHIL NFR BLD AUTO: 1.8 % (ref 0.3–6.2)
ERYTHROCYTE [DISTWIDTH] IN BLOOD BY AUTOMATED COUNT: 11.7 % (ref 12.3–15.4)
GFR SERPL CREATININE-BSD FRML MDRD: 104 ML/MIN/1.73
GLOBULIN UR ELPH-MCNC: 2.6 GM/DL
GLUCOSE SERPL-MCNC: 110 MG/DL (ref 65–99)
GLUCOSE UR STRIP-MCNC: NEGATIVE MG/DL
HCG SERPL QL: NEGATIVE
HCT VFR BLD AUTO: 40.6 % (ref 34–46.6)
HGB BLD-MCNC: 13.3 G/DL (ref 12–15.9)
HGB UR QL STRIP.AUTO: NEGATIVE
IMM GRANULOCYTES # BLD AUTO: 0 10*3/MM3 (ref 0–0.05)
IMM GRANULOCYTES NFR BLD AUTO: 0 % (ref 0–0.5)
KETONES UR QL STRIP: NEGATIVE
LEUKOCYTE ESTERASE UR QL STRIP.AUTO: NEGATIVE
LIPASE SERPL-CCNC: 34 U/L (ref 13–60)
LYMPHOCYTES # BLD AUTO: 1.44 10*3/MM3 (ref 0.7–3.1)
LYMPHOCYTES NFR BLD AUTO: 36.6 % (ref 19.6–45.3)
MCH RBC QN AUTO: 29.9 PG (ref 26.6–33)
MCHC RBC AUTO-ENTMCNC: 32.8 G/DL (ref 31.5–35.7)
MCV RBC AUTO: 91.2 FL (ref 79–97)
MONOCYTES # BLD AUTO: 0.39 10*3/MM3 (ref 0.1–0.9)
MONOCYTES NFR BLD AUTO: 9.9 % (ref 5–12)
NEUTROPHILS NFR BLD AUTO: 2 10*3/MM3 (ref 1.7–7)
NEUTROPHILS NFR BLD AUTO: 50.9 % (ref 42.7–76)
NITRITE UR QL STRIP: NEGATIVE
NRBC BLD AUTO-RTO: 0 /100 WBC (ref 0–0.2)
PH UR STRIP.AUTO: 8 [PH] (ref 5–8)
PLATELET # BLD AUTO: 164 10*3/MM3 (ref 140–450)
PMV BLD AUTO: 11.2 FL (ref 6–12)
POTASSIUM SERPL-SCNC: 3.7 MMOL/L (ref 3.5–5.2)
PROT SERPL-MCNC: 7.3 G/DL (ref 6–8.5)
PROT UR QL STRIP: NEGATIVE
RBC # BLD AUTO: 4.45 10*6/MM3 (ref 3.77–5.28)
SODIUM SERPL-SCNC: 137 MMOL/L (ref 136–145)
SP GR UR STRIP: <1.005 (ref 1–1.03)
UROBILINOGEN UR QL STRIP: ABNORMAL
WBC # BLD AUTO: 3.93 10*3/MM3 (ref 3.4–10.8)

## 2020-12-18 PROCEDURE — 84703 CHORIONIC GONADOTROPIN ASSAY: CPT | Performed by: EMERGENCY MEDICINE

## 2020-12-18 PROCEDURE — 25010000002 IOPAMIDOL 61 % SOLUTION: Performed by: EMERGENCY MEDICINE

## 2020-12-18 PROCEDURE — 80053 COMPREHEN METABOLIC PANEL: CPT | Performed by: EMERGENCY MEDICINE

## 2020-12-18 PROCEDURE — 81003 URINALYSIS AUTO W/O SCOPE: CPT | Performed by: EMERGENCY MEDICINE

## 2020-12-18 PROCEDURE — 85025 COMPLETE CBC W/AUTO DIFF WBC: CPT | Performed by: EMERGENCY MEDICINE

## 2020-12-18 PROCEDURE — 99283 EMERGENCY DEPT VISIT LOW MDM: CPT

## 2020-12-18 PROCEDURE — 83690 ASSAY OF LIPASE: CPT | Performed by: EMERGENCY MEDICINE

## 2020-12-18 PROCEDURE — 74177 CT ABD & PELVIS W/CONTRAST: CPT

## 2020-12-18 RX ORDER — SODIUM CHLORIDE 0.9 % (FLUSH) 0.9 %
10 SYRINGE (ML) INJECTION AS NEEDED
Status: DISCONTINUED | OUTPATIENT
Start: 2020-12-18 | End: 2020-12-18 | Stop reason: HOSPADM

## 2020-12-18 RX ORDER — METRONIDAZOLE 500 MG/1
500 TABLET ORAL 4 TIMES DAILY
Qty: 28 TABLET | Refills: 0 | Status: SHIPPED | OUTPATIENT
Start: 2020-12-18 | End: 2020-12-25

## 2020-12-18 RX ADMIN — SODIUM CHLORIDE, POTASSIUM CHLORIDE, SODIUM LACTATE AND CALCIUM CHLORIDE 1000 ML: 600; 310; 30; 20 INJECTION, SOLUTION INTRAVENOUS at 13:21

## 2020-12-18 RX ADMIN — IOPAMIDOL 100 ML: 612 INJECTION, SOLUTION INTRAVENOUS at 13:03

## 2020-12-18 NOTE — ED NOTES
Pt arrives with c/o RUQ pain that began this past Wednesday, pain constant and worse with movement and after eating. Denies n/v/d. Hx of diverticulitis.     Pt noted to have mask on when this RN entered the room. This RN wore appropriate PPE throughout our encounter. Hand hygiene performed upon entering and exiting room.     Ronel Yeh, RN  12/18/20 [id number]

## 2020-12-18 NOTE — ED TRIAGE NOTES
Pt arrives in the ER complaining of URQ pain that began on Wednesday of this week. Pt denies n/v/d. Pt has a hx of diverticulitis Pt masked on arrival. This RN wearing appropriate PPE during pt encounter.

## 2020-12-18 NOTE — TELEPHONE ENCOUNTER
Call to pt.  Developed constant RUQ dull, low discomfort 12/16. States can function, but always aware of this area.  Sneezed yesterday and had momentary sudden sharp pain - 10 on pain scale.  Knows that bending over exacerbates discomfort. Tender to touch.  Flares after eating, therefore eating smoothies and soft food.  Denies fever.    BM's normal.    States has been thinking about going to ER, but wanted guidance first.  Advise will send message to Dr Samuels and in the meantime - if pain becomes OOC - go to ER.  Verb understanding.

## 2020-12-18 NOTE — TELEPHONE ENCOUNTER
----- Message from Lukas Roa sent at 12/18/2020  8:09 AM EST -----  Regarding: Abdominal Pain  Contact: [phone]  PT is having Abdominal pain and would like to discuss symptoms before going to the ER, please advise

## 2020-12-18 NOTE — TELEPHONE ENCOUNTER
Looks like pt in ed - rec ct to r/o diverticulitis given previous CT findings and her symptoms- will f/u results

## 2020-12-18 NOTE — ED PROVIDER NOTES
EMERGENCY DEPARTMENT ENCOUNTER    Room Number:  23/23  Date of encounter:  12/18/2020  PCP: Vaishnavi Woo MD  Historian: Patient      HPI:  Chief Complaint: abdominal pain   A complete HPI/ROS/PMH/PSH/SH/FH are unobtainable due to: none    Context: Cynthia Box is a 35 y.o. female who presents to the ED c/o abdominal pain. Onset 3 days ago. Pain is constant and located in the RUQ. It is worse with sneezing, bending forward, and drinking a smoothie this morning. No fever, vomiting, diarrhea. H/O IBS.       PAST MEDICAL HISTORY  Active Ambulatory Problems     Diagnosis Date Noted   • Abnormal CT scan, colon 01/16/2020   • Dysfunctional uterine bleeding 02/19/2020   • Iron (Fe) deficiency anemia 02/22/2020     Resolved Ambulatory Problems     Diagnosis Date Noted   • No Resolved Ambulatory Problems     Past Medical History:   Diagnosis Date   • Diverticulitis    • IBS (irritable bowel syndrome)          PAST SURGICAL HISTORY  Past Surgical History:   Procedure Laterality Date   • COLONOSCOPY N/A 8/31/2020    Procedure: COLONOSCOPY TO CECUM AND TERMINAL ILEUM WITH BIOPSIES WITH COLD BIOPSY POLYPECTOMY;  Surgeon: Julieta Samuels MD;  Location: [facility] ENDOSCOPY;  Service: Gastroenterology;  Laterality: N/A;  PRE- ABNORMAL CT  POST- DIVERTICULOSIS, COLON POLYP, HEMORRHOIDS   • LEEP  2009   • TONSILLECTOMY           FAMILY HISTORY  Family History   Problem Relation Age of Onset   • Heart attack Father    • Cancer Maternal Grandmother         colon   • Colon cancer Maternal Grandmother    • Heart attack Paternal Grandfather          SOCIAL HISTORY  Social History     Socioeconomic History   • Marital status:      Spouse name: Not on file   • Number of children: 1   • Years of education: Not on file   • Highest education level: Not on file   Occupational History   • Occupation:    Tobacco Use   • Smoking status: Never Smoker   • Smokeless tobacco: Never Used   Substance and Sexual Activity   •  Alcohol use: Yes     Comment: socially   • Drug use: No   • Sexual activity: Defer         ALLERGIES  Codeine        REVIEW OF SYSTEMS  Review of Systems     All systems reviewed and negative except for those discussed in HPI.       PHYSICAL EXAM    I have reviewed the triage vital signs and nursing notes.    ED Triage Vitals   Temp Heart Rate Resp BP SpO2   12/18/20 1123 12/18/20 1123 12/18/20 1123 12/18/20 1137 12/18/20 1123   97.6 °F (36.4 °C) 95 16 131/93 100 %      Temp src Heart Rate Source Patient Position BP Location FiO2 (%)   12/18/20 1123 12/18/20 1123 12/18/20 1137 12/18/20 1137 --   Tympanic Monitor Lying Left arm        Physical Exam  GENERAL: not distressed  HENT: nares patent  EYES: no scleral icterus  CV: regular rhythm, regular rate  RESPIRATORY: normal effort, clear to auscultation bilaterally  ABDOMEN: soft minimal upper abdominal tenderness, no rebound or guarding  MUSCULOSKELETAL: no deformity  NEURO: alert, moves all extremities, follows commands  SKIN: warm, dry        LAB RESULTS  Recent Results (from the past 24 hour(s))   Urinalysis With Microscopic If Indicated (No Culture) - Urine, Clean Catch    Collection Time: 12/18/20 11:40 AM    Specimen: Urine, Clean Catch   Result Value Ref Range    Color, UA Yellow Yellow, Straw    Appearance, UA Clear Clear    pH, UA 8.0 5.0 - 8.0    Specific Gravity, UA <1.005 (L) 1.005 - 1.030    Glucose, UA Negative Negative    Ketones, UA Negative Negative    Bilirubin, UA Negative Negative    Blood, UA Negative Negative    Protein, UA Negative Negative    Leuk Esterase, UA Negative Negative    Nitrite, UA Negative Negative    Urobilinogen, UA 0.2 E.U./dL 0.2 - 1.0 E.U./dL   Comprehensive Metabolic Panel    Collection Time: 12/18/20 11:49 AM    Specimen: Blood   Result Value Ref Range    Glucose 110 (H) 65 - 99 mg/dL    BUN 13 6 - 20 mg/dL    Creatinine 0.64 0.57 - 1.00 mg/dL    Sodium 137 136 - 145 mmol/L    Potassium 3.7 3.5 - 5.2 mmol/L    Chloride 103 98  - 107 mmol/L    CO2 27.8 22.0 - 29.0 mmol/L    Calcium 9.9 8.6 - 10.5 mg/dL    Total Protein 7.3 6.0 - 8.5 g/dL    Albumin 4.70 3.50 - 5.20 g/dL    ALT (SGPT) 17 1 - 33 U/L    AST (SGOT) 16 1 - 32 U/L    Alkaline Phosphatase 34 (L) 39 - 117 U/L    Total Bilirubin 0.7 0.0 - 1.2 mg/dL    eGFR Non African Amer 104 >60 mL/min/1.73    Globulin 2.6 gm/dL    A/G Ratio 1.8 g/dL    BUN/Creatinine Ratio 20.3 7.0 - 25.0    Anion Gap 6.2 5.0 - 15.0 mmol/L   Lipase    Collection Time: 12/18/20 11:49 AM    Specimen: Blood   Result Value Ref Range    Lipase 34 13 - 60 U/L   hCG, Serum, Qualitative    Collection Time: 12/18/20 11:49 AM    Specimen: Blood   Result Value Ref Range    HCG Qualitative Negative Negative   CBC Auto Differential    Collection Time: 12/18/20 11:49 AM    Specimen: Blood   Result Value Ref Range    WBC 3.93 3.40 - 10.80 10*3/mm3    RBC 4.45 3.77 - 5.28 10*6/mm3    Hemoglobin 13.3 12.0 - 15.9 g/dL    Hematocrit 40.6 34.0 - 46.6 %    MCV 91.2 79.0 - 97.0 fL    MCH 29.9 26.6 - 33.0 pg    MCHC 32.8 31.5 - 35.7 g/dL    RDW 11.7 (L) 12.3 - 15.4 %    RDW-SD 39.2 37.0 - 54.0 fl    MPV 11.2 6.0 - 12.0 fL    Platelets 164 140 - 450 10*3/mm3    Neutrophil % 50.9 42.7 - 76.0 %    Lymphocyte % 36.6 19.6 - 45.3 %    Monocyte % 9.9 5.0 - 12.0 %    Eosinophil % 1.8 0.3 - 6.2 %    Basophil % 0.8 0.0 - 1.5 %    Immature Grans % 0.0 0.0 - 0.5 %    Neutrophils, Absolute 2.00 1.70 - 7.00 10*3/mm3    Lymphocytes, Absolute 1.44 0.70 - 3.10 10*3/mm3    Monocytes, Absolute 0.39 0.10 - 0.90 10*3/mm3    Eosinophils, Absolute 0.07 0.00 - 0.40 10*3/mm3    Basophils, Absolute 0.03 0.00 - 0.20 10*3/mm3    Immature Grans, Absolute 0.00 0.00 - 0.05 10*3/mm3    nRBC 0.0 0.0 - 0.2 /100 WBC       Ordered the above labs and independently reviewed the results.        RADIOLOGY  Ct Abdomen Pelvis With Contrast    Result Date: 12/18/2020  CT ABDOMEN AND PELVIS WITH CONTRAST-  Radiation dose reduction techniques were utilized, including automated  exposure control and exposure modulation based on body size.  CLINICAL INFORMATION:  Right upper quadrant pain.  COMPARISON: 01/02/2020  CT FINDINGS: There are again demonstrated stable tiny hepatic hypodensities with the largest of these 3-4 mm, suggesting hepatic cysts. No suspicious liver lesion has developed. The liver is normal in size and shape. There is enlargement of the spleen which measures approximately 15 cm craniocaudal, size similar to the previous examination. No gallstones or gallbladder wall thickening nor biliary duct dilatation. The pancreas is normal. The stomach is collapsed.  There is wall thickening involving the 2nd portion of the duodenum without focal ulceration seen, suggesting duodenitis. The duodenal bulb is collapsed and cannot be well evaluated.  Both adrenal glands and both kidneys have a normal appearance. No obstructive uropathy. Diameter of the aorta is within normal limits. Urinary bladder is typical in appearance. There is an IUD demonstrated within the endometrial cavity, the uterus is anteverted and its size within normal limits. There are peripheral follicles arising from both ovaries, the ovaries are symmetric in size and shape and satisfactory in appearance. There is a small amount of free fluid demonstrated within the pelvic cul-de-sac.  The appendix is normal.  Previously demonstrated pericolonic fat induration along the cecum has completely resolved without residual.  Caliber of the large and small bowel is within normal limits. No bowel wall thickening.  No free intraperitoneal air nor adenopathy is seen.  CONCLUSION: 1. Wall thickening involving the 2nd duodenum consistent with duodenitis. 2. Previously demonstrated pericolonic fat induration in the region of the cecum has resolved. The appendix is normal. 3. Small amount of free fluid within the pelvic cul-de-sac, indeterminant etiology. The ovaries and uterus have a satisfactory appearance, IUD in place.  Findings of  this report called to Dr. Campa in the emergency room at the time of completion, 2:10 PM.   This report was finalized on 12/18/2020 2:45 PM by Dr. Sidney Bronson M.D.        I ordered the above noted radiological studies. Reviewed by me and discussed with radiologist.  See dictation for official radiology interpretation.      PROCEDURES    Procedures      MEDICATIONS GIVEN IN ER    Medications   lactated ringers bolus 1,000 mL (0 mL Intravenous Stopped 12/18/20 1451)   iopamidol (ISOVUE-300) 61 % injection 100 mL (100 mL Intravenous Given by Other 12/18/20 1303)         PROGRESS, DATA ANALYSIS, CONSULTS, AND MEDICAL DECISION MAKING    All labs have been independently reviewed by me.  All radiology studies have been reviewed by me and discussed with radiologist dictating the report.   EKG's independently viewed and interpreted by me.  Discussion below represents my analysis of pertinent findings related to patient's condition, differential diagnosis, treatment plan and final disposition.    Differential diagnosis includes but not limited to:  - hepatobiliary pathology such as cholecystitis, cholangitis, and symptomatic cholelithiasis  - Pancreatitis  - Dyspepsia  - Small bowel obstruction  - Appendicitis  - Diverticulitis  - UTI including pyelonephritis  - Ureteral stone  - Zoster  - Colitis, including infectious and ischemic      ED Course as of Dec 18 1912   Fri Dec 18, 2020   1318 WBC: 3.93 [TD]   1318 Lipase: 34 [TD]   1422 I discussed the case with Dr. Bronson, radiology.  Patient has evidence of duodenitis.    [TD]   1423 WBC: 3.93 [TD]   1423 HCG Qualitative: Negative [TD]   1423 Lipase: 34 [TD]      ED Course User Index  [TD] Melvin Campa II, MD     This is in the second part of the duodenum.  Differential includes infection, inflammatory disease, acid production.    Giving her Flagyl to go home with.      PPE: Both the patient and I wore a surgical mask throughout the entire patient encounter. I wore  protective goggles.     AS OF 19:12 EST VITALS:    BP - 116/81  HR - 81  TEMP - 97.6 °F (36.4 °C) (Tympanic)  O2 SATS - 98%        DIAGNOSIS  Final diagnoses:   Duodenitis         DISPOSITION  DISCHARGE    FOLLOW-UP  aVishnavi Woo MD  [address]  [facility] [id number] [phone]    Schedule an appointment as soon as possible for a visit   As needed    [facility] Emergency Department  [address] [address] [phone]  Go to   immediately if symptoms worsen    Julieta Samuels MD  [address]  [place] [id number] [phone]    Go to   if symptoms recur or do not improve in the next 3-4 days         Medication List      New Prescriptions    metroNIDAZOLE 500 MG tablet  Commonly known as: FLAGYL  Take 1 tablet by mouth 4 (Four) Times a Day for 7 days.           Where to Get Your Medications      These medications were sent to [facility]  [address] [address]    Hours: 7:00AM-6PM Mon-Fri Phone: [phone]   · metroNIDAZOLE 500 MG tablet                  Melvin Campa II, MD  12/18/20 7592

## 2020-12-18 NOTE — TELEPHONE ENCOUNTER
Message noted.    Problem: Cardiac Output, Decreased (Adult)  Goal: Adequate Cardiac Output/Effective Tissue Perfusion  Outcome: Ongoing (interventions implemented as appropriate)  Receiving blood

## 2020-12-21 ENCOUNTER — OFFICE VISIT (OUTPATIENT)
Dept: GASTROENTEROLOGY | Facility: CLINIC | Age: 35
End: 2020-12-21

## 2020-12-21 ENCOUNTER — RESULTS ENCOUNTER (OUTPATIENT)
Dept: GASTROENTEROLOGY | Facility: CLINIC | Age: 35
End: 2020-12-21

## 2020-12-21 VITALS — HEIGHT: 65 IN | TEMPERATURE: 97.8 F | BODY MASS INDEX: 22.16 KG/M2 | WEIGHT: 133 LBS

## 2020-12-21 DIAGNOSIS — Z87.19 HISTORY OF DIVERTICULITIS: ICD-10-CM

## 2020-12-21 DIAGNOSIS — R93.3 ABNORMAL CT SCAN, SMALL BOWEL: ICD-10-CM

## 2020-12-21 DIAGNOSIS — K29.80 DUODENITIS: ICD-10-CM

## 2020-12-21 DIAGNOSIS — R16.2 HEPATOSPLENOMEGALY: ICD-10-CM

## 2020-12-21 DIAGNOSIS — R10.10 PAIN OF UPPER ABDOMEN: Primary | ICD-10-CM

## 2020-12-21 PROCEDURE — 99214 OFFICE O/P EST MOD 30 MIN: CPT | Performed by: NURSE PRACTITIONER

## 2020-12-21 NOTE — PROGRESS NOTES
Chief Complaint   Patient presents with   • ER follow up   • Abdominal Pain       Cynthia Box is a  35 y.o. female here for a follow up visit for abdominal pain.    HPI  35-year-old female presents today for follow-up visit for abdominal pain.  She is a patient of Dr. Samuels.  She was last seen in the office by me on 11/9/2020.  She admits on last Wednesday she had severe upper abdominal pain to the point that it was a 10 out of 10 when she was sneezing or bending over.  With her history of diverticulitis she went to the [facility] ER for immediate evaluation on 12/18/2020.  She had a CT scan of the abdomen and pelvis done that showed:    CONCLUSION:  1. Wall thickening involving the 2nd duodenum consistent with  duodenitis.  2. Previously demonstrated pericolonic fat induration in the region of  the cecum has resolved. The appendix is normal.  3. Small amount of free fluid within the pelvic cul-de-sac,  indeterminant etiology. The ovaries and uterus have a satisfactory  appearance, IUD in place.     Labs showed a lower white count at 3.93 with a normal amylase and lipase.  All other labs were normal.  She was started on Flagyl.  She admits since coming home and being on Flagyl now for the past 3 days she is feeling a lot better.  She does not feel 100% better but definitely moving in the right direction.  She does me her appetite is better and her bowels are back to normal.  She is still having some tender spots on her upper abdomen but she admits it is much better.  She denies any dysphagia, reflux, nausea and vomiting, diarrhea, constipation, rectal bleeding or melena.  She was appetite is good and her weight is stable.  She does have a previous history of diverticulitis.  She underwent colonoscopy on 8/31/2020.  She does have a history of colon polyps.  She is not on any fiber or probiotics.  She does have a history of IBS.  Past Medical History:   Diagnosis Date   • Diverticulitis    • IBS (irritable bowel  syndrome)        Past Surgical History:   Procedure Laterality Date   • COLONOSCOPY N/A 8/31/2020    Procedure: COLONOSCOPY TO CECUM AND TERMINAL ILEUM WITH BIOPSIES WITH COLD BIOPSY POLYPECTOMY;  Surgeon: Julieta Samuels MD;  Location: [facility] ENDOSCOPY;  Service: Gastroenterology;  Laterality: N/A;  PRE- ABNORMAL CT  POST- DIVERTICULOSIS, COLON POLYP, HEMORRHOIDS   • LEEP  2009   • TONSILLECTOMY         Scheduled Meds:    Continuous Infusions:No current facility-administered medications for this visit.       PRN Meds:.    Allergies   Allergen Reactions   • Codeine Hives       Social History     Socioeconomic History   • Marital status:      Spouse name: Not on file   • Number of children: 1   • Years of education: Not on file   • Highest education level: Not on file   Occupational History   • Occupation:    Tobacco Use   • Smoking status: Never Smoker   • Smokeless tobacco: Never Used   Substance and Sexual Activity   • Alcohol use: Yes     Comment: socially   • Drug use: No   • Sexual activity: Defer       Family History   Problem Relation Age of Onset   • Heart attack Father    • Cancer Maternal Grandmother         colon   • Colon cancer Maternal Grandmother    • Heart attack Paternal Grandfather        Review of Systems   Constitutional: Negative for appetite change, chills, diaphoresis, fatigue, fever and unexpected weight change.   HENT: Negative for nosebleeds, postnasal drip, sore throat, trouble swallowing and voice change.    Respiratory: Negative for cough, choking, chest tightness, shortness of breath and wheezing.    Cardiovascular: Negative for chest pain, palpitations and leg swelling.   Gastrointestinal: Positive for abdominal distention and abdominal pain. Negative for anal bleeding, blood in stool, constipation, diarrhea, nausea, rectal pain and vomiting.   Endocrine: Negative for polydipsia, polyphagia and polyuria.   Musculoskeletal: Negative for gait problem.   Skin:  Negative for rash and wound.   Allergic/Immunologic: Negative for food allergies.   Neurological: Negative for dizziness, speech difficulty and light-headedness.   Psychiatric/Behavioral: Negative for confusion, self-injury, sleep disturbance and suicidal ideas.       Vitals:    12/21/20 1148   Temp: 97.8 °F (36.6 °C)       Physical Exam  Constitutional:       General: She is not in acute distress.     Appearance: She is well-developed. She is not ill-appearing.   HENT:      Head: Normocephalic.   Eyes:      Pupils: Pupils are equal, round, and reactive to light.   Cardiovascular:      Rate and Rhythm: Normal rate and regular rhythm.      Heart sounds: Normal heart sounds.   Pulmonary:      Effort: Pulmonary effort is normal.      Breath sounds: Normal breath sounds.   Abdominal:      General: Bowel sounds are normal. There is distension.      Palpations: Abdomen is soft. There is no mass.      Tenderness: There is abdominal tenderness. There is no guarding or rebound.      Hernia: No hernia is present.       Musculoskeletal: Normal range of motion.   Skin:     General: Skin is warm and dry.   Neurological:      Mental Status: She is alert and oriented to person, place, and time.   Psychiatric:         Speech: Speech normal.         Behavior: Behavior normal.         Judgment: Judgment normal.         No radiology results for the last 7 days     Assessment and plan     1. Pain of upper abdomen  - CBC & Differential  - Comprehensive Metabolic Panel  - Amylase  - Lipase    2. Abnormal CT scan, small bowel  - CBC & Differential  - Comprehensive Metabolic Panel  - Amylase  - Lipase    3. Duodenitis  - CBC & Differential  - Comprehensive Metabolic Panel  - Amylase  - Lipase    4. History of diverticulitis      Reviewed ER work-up with her today.  CT scan was positive for duodenitis.  Continue Flagyl.  She is already seeing some improvement.  Abdominal pain is much better but not 100% yet.  Bowels are moving well and  appetite has improved.  Will repeat labs today.  Continue a bland diet.  I want her to start probiotics once she finishes with the Flagyl.  Patient to call the office next week with an update.  Patient to follow-up with me in 1 month.

## 2020-12-22 ENCOUNTER — TELEPHONE (OUTPATIENT)
Dept: GASTROENTEROLOGY | Facility: CLINIC | Age: 35
End: 2020-12-22

## 2020-12-22 LAB
ALBUMIN SERPL-MCNC: 4.6 G/DL (ref 3.8–4.8)
ALBUMIN/GLOB SERPL: 2.1 {RATIO} (ref 1.2–2.2)
ALP SERPL-CCNC: 37 IU/L (ref 39–117)
ALT SERPL-CCNC: 13 IU/L (ref 0–32)
AMYLASE SERPL-CCNC: 66 U/L (ref 31–110)
AST SERPL-CCNC: 16 IU/L (ref 0–40)
BASOPHILS # BLD AUTO: 0 X10E3/UL (ref 0–0.2)
BASOPHILS NFR BLD AUTO: 1 %
BILIRUB SERPL-MCNC: 0.4 MG/DL (ref 0–1.2)
BUN SERPL-MCNC: 13 MG/DL (ref 6–20)
BUN/CREAT SERPL: 18 (ref 9–23)
CALCIUM SERPL-MCNC: 9.4 MG/DL (ref 8.7–10.2)
CHLORIDE SERPL-SCNC: 105 MMOL/L (ref 96–106)
CO2 SERPL-SCNC: 24 MMOL/L (ref 20–29)
CREAT SERPL-MCNC: 0.74 MG/DL (ref 0.57–1)
EOSINOPHIL # BLD AUTO: 0.1 X10E3/UL (ref 0–0.4)
EOSINOPHIL NFR BLD AUTO: 2 %
ERYTHROCYTE [DISTWIDTH] IN BLOOD BY AUTOMATED COUNT: 11.9 % (ref 11.7–15.4)
GLOBULIN SER CALC-MCNC: 2.2 G/DL (ref 1.5–4.5)
GLUCOSE SERPL-MCNC: 88 MG/DL (ref 65–99)
HCT VFR BLD AUTO: 40.6 % (ref 34–46.6)
HGB BLD-MCNC: 13.1 G/DL (ref 11.1–15.9)
IMM GRANULOCYTES # BLD AUTO: 0 X10E3/UL (ref 0–0.1)
IMM GRANULOCYTES NFR BLD AUTO: 0 %
LIPASE SERPL-CCNC: 36 U/L (ref 14–72)
LYMPHOCYTES # BLD AUTO: 1.2 X10E3/UL (ref 0.7–3.1)
LYMPHOCYTES NFR BLD AUTO: 32 %
MCH RBC QN AUTO: 29.7 PG (ref 26.6–33)
MCHC RBC AUTO-ENTMCNC: 32.3 G/DL (ref 31.5–35.7)
MCV RBC AUTO: 92 FL (ref 79–97)
MONOCYTES # BLD AUTO: 0.4 X10E3/UL (ref 0.1–0.9)
MONOCYTES NFR BLD AUTO: 11 %
NEUTROPHILS # BLD AUTO: 2 X10E3/UL (ref 1.4–7)
NEUTROPHILS NFR BLD AUTO: 54 %
PLATELET # BLD AUTO: 163 X10E3/UL (ref 150–450)
POTASSIUM SERPL-SCNC: 4.4 MMOL/L (ref 3.5–5.2)
PROT SERPL-MCNC: 6.8 G/DL (ref 6–8.5)
RBC # BLD AUTO: 4.41 X10E6/UL (ref 3.77–5.28)
SODIUM SERPL-SCNC: 140 MMOL/L (ref 134–144)
WBC # BLD AUTO: 3.7 X10E3/UL (ref 3.4–10.8)

## 2020-12-22 NOTE — TELEPHONE ENCOUNTER
----- Message from RADHA Kent sent at 12/22/2020  9:15 AM EST -----  Please call the patient and let her know all of her labs look good.  One of her for liver enzymes is only a few points above normal.  But this looks to be pretty normal for her.

## 2021-01-04 ENCOUNTER — LAB (OUTPATIENT)
Dept: GASTROENTEROLOGY | Facility: CLINIC | Age: 36
End: 2021-01-04

## 2021-01-06 ENCOUNTER — TELEPHONE (OUTPATIENT)
Dept: GASTROENTEROLOGY | Facility: CLINIC | Age: 36
End: 2021-01-06

## 2021-01-06 NOTE — TELEPHONE ENCOUNTER
----- Message from RADHA Kent sent at 1/5/2021  1:01 PM EST -----  Please call the patient and let her know her labs look good.  Thanks

## 2021-03-08 ENCOUNTER — OFFICE VISIT (OUTPATIENT)
Dept: GASTROENTEROLOGY | Facility: CLINIC | Age: 36
End: 2021-03-08

## 2021-03-08 VITALS — BODY MASS INDEX: 22.46 KG/M2 | HEIGHT: 65 IN | WEIGHT: 134.8 LBS | TEMPERATURE: 98.6 F

## 2021-03-08 DIAGNOSIS — K57.90 DIVERTICULOSIS: ICD-10-CM

## 2021-03-08 DIAGNOSIS — R10.10 PAIN OF UPPER ABDOMEN: Primary | ICD-10-CM

## 2021-03-08 DIAGNOSIS — K29.80 DUODENITIS: ICD-10-CM

## 2021-03-08 PROCEDURE — 99214 OFFICE O/P EST MOD 30 MIN: CPT | Performed by: NURSE PRACTITIONER

## 2021-03-08 RX ORDER — DICYCLOMINE HCL 20 MG
20 TABLET ORAL 3 TIMES DAILY PRN
Qty: 90 TABLET | Refills: 3 | Status: SHIPPED | OUTPATIENT
Start: 2021-03-08 | End: 2022-05-13

## 2021-03-08 NOTE — PROGRESS NOTES
Chief Complaint   Patient presents with   • Follow-up   • Abdominal Pain       Cynthia Box is a  35 y.o. female here for a follow up visit for abdominal pain.    HPI  35-year-old female presents today for follow-up visit for upper abdominal pain.  She is a patient of Dr. Samuels.  She was last seen in the office by me on 12/21/2020.  At that time she had had an abnormal CT scan of her abdomen and pelvis and was diagnosed with duodenitis.  She did a round of Flagyl and admits after the Flagyl she felt great for several months.  She did have a colonoscopy on 8/31/2020 that did show diverticulosis and a colon polyp with some nonbleeding internal hemorrhoids.  She did start immediately on some probiotics after finishing with the Flagyl.  She tells me she is done great until about 3 weeks ago.  She had about a week of severe upper abdominal pain to the point to where if she stood up the pain was severe if she lied down then the pain would go away unless she laid on her right side that it was worse.  She admits she had no nausea or vomiting or diarrhea or constipation with this pain.  She does not recall eating anything in particular or taking any new medication.  She is taking zinc and vitamin D OTC.  She admits the pain is a lot better today.  She can still feel where she is slightly tender if she touches but otherwise she feels a lot better.  She admits during this week of abdominal pain she had no change in bowel habits.  She tells me she was having good normal daily bowel movements.  She denies any dysphagia, reflux, nausea and vomiting, diarrhea, constipation, rectal bleeding or melena.  She was appetite is good and her weight is stable.  Past Medical History:   Diagnosis Date   • Diverticulitis    • IBS (irritable bowel syndrome)        Past Surgical History:   Procedure Laterality Date   • COLONOSCOPY N/A 8/31/2020    Procedure: COLONOSCOPY TO CECUM AND TERMINAL ILEUM WITH BIOPSIES WITH COLD BIOPSY POLYPECTOMY;   Surgeon: Julieta Samuels MD;  Location: [facility] ENDOSCOPY;  Service: Gastroenterology;  Laterality: N/A;  PRE- ABNORMAL CT  POST- DIVERTICULOSIS, COLON POLYP, HEMORRHOIDS   • LEEP  2009   • TONSILLECTOMY         Scheduled Meds:    Continuous Infusions:No current facility-administered medications for this visit.      PRN Meds:.    Allergies   Allergen Reactions   • Codeine Hives       Social History     Socioeconomic History   • Marital status:      Spouse name: Not on file   • Number of children: 1   • Years of education: Not on file   • Highest education level: Not on file   Tobacco Use   • Smoking status: Never Smoker   • Smokeless tobacco: Never Used   Substance and Sexual Activity   • Alcohol use: Yes     Comment: socially   • Drug use: No   • Sexual activity: Defer       Family History   Problem Relation Age of Onset   • Heart attack Father    • Cancer Maternal Grandmother         colon   • Colon cancer Maternal Grandmother    • Heart attack Paternal Grandfather        Review of Systems   Constitutional: Negative for appetite change, chills, diaphoresis, fatigue, fever and unexpected weight change.   HENT: Negative for nosebleeds, postnasal drip, sore throat, trouble swallowing and voice change.    Respiratory: Negative for cough, choking, chest tightness, shortness of breath and wheezing.    Cardiovascular: Negative for chest pain, palpitations and leg swelling.   Gastrointestinal: Positive for abdominal pain. Negative for abdominal distention, anal bleeding, blood in stool, constipation, diarrhea, nausea, rectal pain and vomiting.   Endocrine: Negative for polydipsia, polyphagia and polyuria.   Musculoskeletal: Negative for gait problem.   Skin: Negative for rash and wound.   Allergic/Immunologic: Negative for food allergies.   Neurological: Negative for dizziness, speech difficulty and light-headedness.   Psychiatric/Behavioral: Negative for confusion, self-injury, sleep disturbance and suicidal  ideas.       Vitals:    03/08/21 1256   Temp: 98.6 °F (37 °C)       Physical Exam  Constitutional:       General: She is not in acute distress.     Appearance: She is well-developed. She is not ill-appearing.   HENT:      Head: Normocephalic.   Eyes:      Pupils: Pupils are equal, round, and reactive to light.   Cardiovascular:      Rate and Rhythm: Normal rate and regular rhythm.      Heart sounds: Normal heart sounds.   Pulmonary:      Effort: Pulmonary effort is normal.      Breath sounds: Normal breath sounds.   Abdominal:      General: Bowel sounds are normal. There is no distension.      Palpations: Abdomen is soft. There is no mass.      Tenderness: There is abdominal tenderness. There is no guarding or rebound.      Hernia: No hernia is present.       Musculoskeletal:         General: Normal range of motion.   Skin:     General: Skin is warm and dry.   Neurological:      Mental Status: She is alert and oriented to person, place, and time.   Psychiatric:         Speech: Speech normal.         Behavior: Behavior normal.         Judgment: Judgment normal.         No radiology results for the last 7 days     Assessment and plan     1. Pain of upper abdomen  - CBC & Differential  - Comprehensive Metabolic Panel  - Amylase  - Lipase  - CT Abdomen Pelvis With Contrast; Future    2. Duodenitis  - CBC & Differential  - Comprehensive Metabolic Panel  - Amylase  - Lipase  - CT Abdomen Pelvis With Contrast; Future    3. Diverticulosis  - CT Abdomen Pelvis With Contrast; Future    Reviewed results of her most recent labs with her today.  Those were good.  I am not sure what she has going on today?  Will go ahead and get labs and a CT scan of the abdomen and pelvis for further evaluation.  Sounds like the abdominal pain is definitely better but just not 100% gone.  Bowels are moving well.  We will give her some bentyl and see if it helps the abdominal pain.  Patient to call the office next week with an update.  Patient to  follow-up with me in 3 to 4 weeks.  Patient to go ahead make follow-up with Dr. Molina in 3 to 4 months.

## 2021-03-09 LAB
ALBUMIN SERPL-MCNC: 4.5 G/DL (ref 3.5–5.2)
ALBUMIN/GLOB SERPL: 2 G/DL
ALP SERPL-CCNC: 41 U/L (ref 39–117)
ALT SERPL-CCNC: 11 U/L (ref 1–33)
AMYLASE SERPL-CCNC: 67 U/L (ref 28–100)
AST SERPL-CCNC: 16 U/L (ref 1–32)
BASOPHILS # BLD AUTO: 0.03 10*3/MM3 (ref 0–0.2)
BASOPHILS NFR BLD AUTO: 0.6 % (ref 0–1.5)
BILIRUB SERPL-MCNC: 0.5 MG/DL (ref 0–1.2)
BUN SERPL-MCNC: 13 MG/DL (ref 6–20)
BUN/CREAT SERPL: 20.3 (ref 7–25)
CALCIUM SERPL-MCNC: 9.5 MG/DL (ref 8.6–10.5)
CHLORIDE SERPL-SCNC: 102 MMOL/L (ref 98–107)
CO2 SERPL-SCNC: 27.3 MMOL/L (ref 22–29)
CREAT SERPL-MCNC: 0.64 MG/DL (ref 0.57–1)
EOSINOPHIL # BLD AUTO: 0.08 10*3/MM3 (ref 0–0.4)
EOSINOPHIL NFR BLD AUTO: 1.6 % (ref 0.3–6.2)
ERYTHROCYTE [DISTWIDTH] IN BLOOD BY AUTOMATED COUNT: 11.8 % (ref 12.3–15.4)
GLOBULIN SER CALC-MCNC: 2.3 GM/DL
GLUCOSE SERPL-MCNC: 86 MG/DL (ref 65–99)
HCT VFR BLD AUTO: 39.5 % (ref 34–46.6)
HGB BLD-MCNC: 13.2 G/DL (ref 12–15.9)
IMM GRANULOCYTES # BLD AUTO: 0.02 10*3/MM3 (ref 0–0.05)
IMM GRANULOCYTES NFR BLD AUTO: 0.4 % (ref 0–0.5)
LIPASE SERPL-CCNC: 35 U/L (ref 13–60)
LYMPHOCYTES # BLD AUTO: 1.7 10*3/MM3 (ref 0.7–3.1)
LYMPHOCYTES NFR BLD AUTO: 33.9 % (ref 19.6–45.3)
MCH RBC QN AUTO: 30.8 PG (ref 26.6–33)
MCHC RBC AUTO-ENTMCNC: 33.4 G/DL (ref 31.5–35.7)
MCV RBC AUTO: 92.3 FL (ref 79–97)
MONOCYTES # BLD AUTO: 0.45 10*3/MM3 (ref 0.1–0.9)
MONOCYTES NFR BLD AUTO: 9 % (ref 5–12)
NEUTROPHILS # BLD AUTO: 2.73 10*3/MM3 (ref 1.7–7)
NEUTROPHILS NFR BLD AUTO: 54.5 % (ref 42.7–76)
NRBC BLD AUTO-RTO: 0.2 /100 WBC (ref 0–0.2)
PLATELET # BLD AUTO: 234 10*3/MM3 (ref 140–450)
POTASSIUM SERPL-SCNC: 3.8 MMOL/L (ref 3.5–5.2)
PROT SERPL-MCNC: 6.8 G/DL (ref 6–8.5)
RBC # BLD AUTO: 4.28 10*6/MM3 (ref 3.77–5.28)
SODIUM SERPL-SCNC: 139 MMOL/L (ref 136–145)
WBC # BLD AUTO: 5.01 10*3/MM3 (ref 3.4–10.8)

## 2021-03-12 ENCOUNTER — TELEPHONE (OUTPATIENT)
Dept: GASTROENTEROLOGY | Facility: CLINIC | Age: 36
End: 2021-03-12

## 2021-03-12 NOTE — TELEPHONE ENCOUNTER
----- Message from RADHA Kent sent at 3/9/2021  9:02 AM EST -----  Please call the patient and let her know her labs look good.  Will await CT scan results.

## 2021-03-18 ENCOUNTER — APPOINTMENT (OUTPATIENT)
Dept: CT IMAGING | Facility: HOSPITAL | Age: 36
End: 2021-03-18

## 2021-03-25 ENCOUNTER — APPOINTMENT (OUTPATIENT)
Dept: CT IMAGING | Facility: HOSPITAL | Age: 36
End: 2021-03-25

## 2021-04-16 ENCOUNTER — BULK ORDERING (OUTPATIENT)
Dept: CASE MANAGEMENT | Facility: OTHER | Age: 36
End: 2021-04-16

## 2021-04-16 DIAGNOSIS — Z23 IMMUNIZATION DUE: ICD-10-CM

## 2021-04-26 ENCOUNTER — HOSPITAL ENCOUNTER (OUTPATIENT)
Dept: CT IMAGING | Facility: HOSPITAL | Age: 36
Discharge: HOME | End: 2021-04-26
Admitting: NURSE PRACTITIONER

## 2021-04-26 DIAGNOSIS — K29.80 DUODENITIS: ICD-10-CM

## 2021-04-26 DIAGNOSIS — K57.90 DIVERTICULOSIS: ICD-10-CM

## 2021-04-26 DIAGNOSIS — R10.10 PAIN OF UPPER ABDOMEN: ICD-10-CM

## 2021-04-26 PROCEDURE — 0 DIATRIZOATE MEGLUMINE & SODIUM PER 1 ML: Performed by: NURSE PRACTITIONER

## 2021-04-26 PROCEDURE — 74177 CT ABD & PELVIS W/CONTRAST: CPT

## 2021-04-26 PROCEDURE — 25010000002 IOPAMIDOL 61 % SOLUTION: Performed by: NURSE PRACTITIONER

## 2021-04-26 RX ADMIN — IOPAMIDOL 85 ML: 612 INJECTION, SOLUTION INTRAVENOUS at 12:41

## 2021-04-26 RX ADMIN — DIATRIZOATE MEGLUMINE AND DIATRIZOATE SODIUM 30 ML: 660; 100 LIQUID ORAL; RECTAL at 11:35

## 2021-04-27 ENCOUNTER — TELEPHONE (OUTPATIENT)
Dept: GASTROENTEROLOGY | Facility: CLINIC | Age: 36
End: 2021-04-27

## 2021-04-27 NOTE — TELEPHONE ENCOUNTER
Call from pt.  Advise per M Hannah note.  Verb understanding.     States does not take anything for constipation because has BM daily and has no sensation of constipation.  Update to M Hannah.     States ok to leave VM with instructions.

## 2021-04-27 NOTE — TELEPHONE ENCOUNTER
----- Message from RADHA Kent sent at 4/26/2021  4:39 PM EDT -----  IMPRESSION:  1.There is considerable retention of stool throughout the colon.     Please call the patient and let her know that her CT scan showed that she is pretty full of stool.  What is she doing to get regular.  What is she currently taking for her constipation?

## 2021-04-28 NOTE — TELEPHONE ENCOUNTER
I think at this point starting a daily fiber supplement would not be a bad idea.  She can do something like FiberCon or Benefiber or Metamucil or even some fiber Gummies.  Just 1 daily to start.  Thanks

## 2021-05-10 ENCOUNTER — OFFICE VISIT (OUTPATIENT)
Dept: GASTROENTEROLOGY | Facility: CLINIC | Age: 36
End: 2021-05-10

## 2021-05-10 VITALS — WEIGHT: 136.2 LBS | HEIGHT: 65 IN | BODY MASS INDEX: 22.69 KG/M2

## 2021-05-10 DIAGNOSIS — K29.80 DUODENITIS: ICD-10-CM

## 2021-05-10 DIAGNOSIS — K59.00 CONSTIPATION, UNSPECIFIED CONSTIPATION TYPE: Primary | ICD-10-CM

## 2021-05-10 DIAGNOSIS — R10.10 PAIN OF UPPER ABDOMEN: ICD-10-CM

## 2021-05-10 PROCEDURE — 99214 OFFICE O/P EST MOD 30 MIN: CPT | Performed by: NURSE PRACTITIONER

## 2021-05-10 RX ORDER — L.ACID,CASEI/B.ANIMAL/S.THERMO 16B CELL
1 CAPSULE ORAL DAILY
COMMUNITY

## 2021-05-10 NOTE — PROGRESS NOTES
Chief Complaint   Patient presents with   • Follow-up       Cynthia Box is a  35 y.o. female here for a follow up visit for abdominal pain.    HPI  35-year-old female presents today for follow-up visit for upper abdominal pain.  She is a patient of Dr. Samuels.  She was last seen in the office by me on 3/8/2021.  She has a history of upper abdominal pain secondary to duodenitis and is happy to report she is finally feeling so much better.  She had a repeat CT scan of the abdomen pelvis done which just showed constipation.  It was negative for any inflammatory process.  She is happy report since starting a daily fiber supplement her bowels are moving really well.  She denies any dysphagia, reflux, abdominal pain, nausea and vomiting, diarrhea, constipation, rectal bleeding or melena.  She is appetite is good and her weight is stable.  Her last colonoscopy was on 8/31/2020.  She does have a history of diverticulosis as well as colon polyp.  Past Medical History:   Diagnosis Date   • Diverticulitis    • IBS (irritable bowel syndrome)        Past Surgical History:   Procedure Laterality Date   • COLONOSCOPY N/A 8/31/2020    Procedure: COLONOSCOPY TO CECUM AND TERMINAL ILEUM WITH BIOPSIES WITH COLD BIOPSY POLYPECTOMY;  Surgeon: Julieta Samuels MD;  Location: [facility] ENDOSCOPY;  Service: Gastroenterology;  Laterality: N/A;  PRE- ABNORMAL CT  POST- DIVERTICULOSIS, COLON POLYP, HEMORRHOIDS   • LEEP  2009   • TONSILLECTOMY         Scheduled Meds:    Continuous Infusions:No current facility-administered medications for this visit.      PRN Meds:.    Allergies   Allergen Reactions   • Codeine Hives       Social History     Socioeconomic History   • Marital status:      Spouse name: Not on file   • Number of children: 1   • Years of education: Not on file   • Highest education level: Not on file   Tobacco Use   • Smoking status: Never Smoker   • Smokeless tobacco: Never Used   Substance and Sexual Activity   •  Alcohol use: Yes     Comment: socially   • Drug use: No   • Sexual activity: Defer       Family History   Problem Relation Age of Onset   • Heart attack Father    • Cancer Maternal Grandmother         colon   • Colon cancer Maternal Grandmother    • Heart attack Paternal Grandfather        Review of Systems   Constitutional: Negative for appetite change, chills, diaphoresis, fatigue, fever and unexpected weight change.   HENT: Negative for nosebleeds, postnasal drip, sore throat, trouble swallowing and voice change.    Respiratory: Negative for cough, choking, chest tightness, shortness of breath and wheezing.    Cardiovascular: Negative for chest pain, palpitations and leg swelling.   Gastrointestinal: Negative for abdominal distention, abdominal pain, anal bleeding, blood in stool, constipation, diarrhea, nausea, rectal pain and vomiting.   Endocrine: Negative for polydipsia, polyphagia and polyuria.   Musculoskeletal: Negative for gait problem.   Skin: Negative for rash and wound.   Allergic/Immunologic: Negative for food allergies.   Neurological: Negative for dizziness, speech difficulty and light-headedness.   Psychiatric/Behavioral: Negative for confusion, self-injury, sleep disturbance and suicidal ideas.       There were no vitals filed for this visit.    Physical Exam  Constitutional:       General: She is not in acute distress.     Appearance: She is well-developed. She is not ill-appearing.   HENT:      Head: Normocephalic.   Eyes:      Pupils: Pupils are equal, round, and reactive to light.   Cardiovascular:      Rate and Rhythm: Normal rate and regular rhythm.      Heart sounds: Normal heart sounds.   Pulmonary:      Effort: Pulmonary effort is normal.      Breath sounds: Normal breath sounds.   Abdominal:      General: Bowel sounds are normal. There is no distension.      Palpations: Abdomen is soft. There is no mass.      Tenderness: There is no abdominal tenderness. There is no guarding or rebound.       Hernia: No hernia is present.   Musculoskeletal:         General: Normal range of motion.   Skin:     General: Skin is warm and dry.   Neurological:      Mental Status: She is alert and oriented to person, place, and time.   Psychiatric:         Speech: Speech normal.         Behavior: Behavior normal.         Judgment: Judgment normal.         No radiology results for the last 7 days     Assessment and plan     1. Constipation, unspecified constipation type    2. Pain of upper abdomen    3. Duodenitis      Reviewed CT scan and lab results with her today.  CT scan was positive for constipation.  Negative for any other acute abdominal process.  Labs were normal.  Overall she seems to doing so much better.  No longer having any abdominal pain.  Bowels are moving well with daily fiber supplementation.  Patient to call the office with any issues.  Patient to palp with me in 1 year.  Patient is agreeable to the plan.

## 2022-05-13 ENCOUNTER — OFFICE VISIT (OUTPATIENT)
Dept: FAMILY MEDICINE CLINIC | Facility: CLINIC | Age: 37
End: 2022-05-13

## 2022-05-13 VITALS
SYSTOLIC BLOOD PRESSURE: 100 MMHG | RESPIRATION RATE: 16 BRPM | OXYGEN SATURATION: 100 % | WEIGHT: 143 LBS | HEIGHT: 65 IN | DIASTOLIC BLOOD PRESSURE: 60 MMHG | HEART RATE: 87 BPM | BODY MASS INDEX: 23.82 KG/M2

## 2022-05-13 DIAGNOSIS — Z13.228 SCREENING FOR METABOLIC DISORDER: Primary | ICD-10-CM

## 2022-05-13 DIAGNOSIS — Z11.59 ENCOUNTER FOR HEPATITIS C SCREENING TEST FOR LOW RISK PATIENT: ICD-10-CM

## 2022-05-13 DIAGNOSIS — Z13.220 SCREENING, LIPID: ICD-10-CM

## 2022-05-13 DIAGNOSIS — Z00.00 ENCOUNTER FOR SCREENING AND PREVENTATIVE CARE: ICD-10-CM

## 2022-05-13 PROBLEM — N84.0 ENDOMETRIAL POLYP: Status: ACTIVE | Noted: 2020-08-27

## 2022-05-13 PROBLEM — Z97.5 IUD CONTRACEPTION: Status: ACTIVE | Noted: 2020-09-15

## 2022-05-13 PROCEDURE — 99395 PREV VISIT EST AGE 18-39: CPT | Performed by: NURSE PRACTITIONER

## 2022-05-13 NOTE — PROGRESS NOTES
Subjective   Cynthia Box is a 36 y.o. female.     History of Present Illness   Patient is new to practice and provider. She hs not seen PCP in years.  He is doing annual exam screening blood work.    She has no acute concerns.  Patient is active, BMI 23, blood pressure 100/60.    Has IUD, no pain, has light periods. Sees GYN Dr Gonzalez.     H/o duodenitis, she has since changed diet to Paleo. She sees nutritionist Mendoza Velez with vegan and GI issues have resolved. Weight is stable.     The following portions of the patient's history were reviewed and updated as appropriate: allergies, current medications, past family history, past medical history, past social history, past surgical history and problem list.    Review of Systems   Constitutional: Negative for activity change, fatigue, unexpected weight gain and unexpected weight loss.   HENT: Negative for congestion and postnasal drip.         Dental exam is up to date   Eyes: Negative for blurred vision and double vision.        Wears glasses , eye exam is up to date   Respiratory: Negative for cough, chest tightness and wheezing.    Cardiovascular: Negative for chest pain, palpitations and leg swelling.   Gastrointestinal: Negative for abdominal pain, blood in stool, constipation, diarrhea and GERD.   Endocrine: Negative for cold intolerance, heat intolerance, polydipsia, polyphagia and polyuria.   Genitourinary: Negative for breast discharge, dysuria, frequency, hematuria and vaginal discharge.   Musculoskeletal: Negative for arthralgias.   Skin: Negative for rash.   Allergic/Immunologic: Negative for environmental allergies.   Neurological: Negative for dizziness, syncope, weakness and headache.   Hematological: Does not bruise/bleed easily.   Psychiatric/Behavioral: Negative for sleep disturbance and depressed mood. The patient is not nervous/anxious.        Objective   Physical Exam  Constitutional:       Appearance: Normal appearance. She is normal  weight.   HENT:      Head: Normocephalic.      Right Ear: Tympanic membrane normal.      Left Ear: Tympanic membrane normal.      Nose: Nose normal.      Mouth/Throat:      Mouth: Mucous membranes are moist.   Eyes:      Pupils: Pupils are equal, round, and reactive to light.   Cardiovascular:      Rate and Rhythm: Normal rate and regular rhythm.      Pulses: Normal pulses.      Heart sounds: Normal heart sounds.   Pulmonary:      Effort: Pulmonary effort is normal.      Breath sounds: Normal breath sounds.   Abdominal:      General: Abdomen is flat. Bowel sounds are normal.      Palpations: Abdomen is soft.   Musculoskeletal:         General: Normal range of motion.      Cervical back: Normal range of motion.   Skin:     General: Skin is warm.   Neurological:      General: No focal deficit present.      Mental Status: She is alert.   Psychiatric:         Mood and Affect: Mood normal.         Vitals:    05/13/22 0946   BP: 100/60   Pulse: 87   Resp: 16   SpO2: 100%     Body mass index is 23.8 kg/m².    Procedures    Assessment & Plan   Problems Addressed this Visit    None     Visit Diagnoses     Screening for metabolic disorder    -  Primary    Relevant Orders    CBC & Differential    Comprehensive metabolic panel    TSH    Lipid panel    Vitamin D 25 hydroxy    Encounter for screening and preventative care        Screening, lipid        Relevant Orders    CBC & Differential    Comprehensive metabolic panel    TSH    Lipid panel    Vitamin D 25 hydroxy    Encounter for hepatitis C screening test for low risk patient        Relevant Orders    Hepatitis C Antibody      Diagnoses       Codes Comments    Screening for metabolic disorder    -  Primary ICD-10-CM: Z13.228  ICD-9-CM: V77.99     Encounter for screening and preventative care     ICD-10-CM: Z00.00  ICD-9-CM: V70.0     Screening, lipid     ICD-10-CM: Z13.220  ICD-9-CM: V77.91     Encounter for hepatitis C screening test for low risk patient     ICD-10-CM:  Z11.59  ICD-9-CM: V73.89       CBC  BMP  Lipids  TSH  Hep C screen    Preventative care encourage patient to follow heart healthy diet, walk daily, hydrate with water.  Follow-up with PCP annually and needed for up-to-date screening and blood work.  Encourage patient to wear sunscreen and hat, encourage patient to keep Ortho and dental appointments.  Encourage patient to wear helmets and seatbelts    Continue with nutritionist.  Call if any acute concerns or GI upset.  Patient educated on low BP, make slow position changes stay hydrated.             Return in about 6 months (around 11/13/2022).

## 2022-05-14 LAB
25(OH)D3+25(OH)D2 SERPL-MCNC: 45.8 NG/ML (ref 30–100)
ALBUMIN SERPL-MCNC: 4.7 G/DL (ref 3.8–4.8)
ALBUMIN/GLOB SERPL: 1.9 {RATIO} (ref 1.2–2.2)
ALP SERPL-CCNC: 42 IU/L (ref 44–121)
ALT SERPL-CCNC: 11 IU/L (ref 0–32)
AST SERPL-CCNC: 13 IU/L (ref 0–40)
BASOPHILS # BLD AUTO: 0 X10E3/UL (ref 0–0.2)
BASOPHILS NFR BLD AUTO: 1 %
BILIRUB SERPL-MCNC: 0.5 MG/DL (ref 0–1.2)
BUN SERPL-MCNC: 10 MG/DL (ref 6–20)
BUN/CREAT SERPL: 14 (ref 9–23)
CALCIUM SERPL-MCNC: 9.6 MG/DL (ref 8.7–10.2)
CHLORIDE SERPL-SCNC: 103 MMOL/L (ref 96–106)
CHOLEST SERPL-MCNC: 167 MG/DL (ref 100–199)
CO2 SERPL-SCNC: 23 MMOL/L (ref 20–29)
CREAT SERPL-MCNC: 0.74 MG/DL (ref 0.57–1)
EGFRCR SERPLBLD CKD-EPI 2021: 106 ML/MIN/1.73
EOSINOPHIL # BLD AUTO: 0.1 X10E3/UL (ref 0–0.4)
EOSINOPHIL NFR BLD AUTO: 3 %
ERYTHROCYTE [DISTWIDTH] IN BLOOD BY AUTOMATED COUNT: 11.8 % (ref 11.7–15.4)
GLOBULIN SER CALC-MCNC: 2.5 G/DL (ref 1.5–4.5)
GLUCOSE SERPL-MCNC: 84 MG/DL (ref 65–99)
HCT VFR BLD AUTO: 41.1 % (ref 34–46.6)
HCV AB S/CO SERPL IA: <0.1 S/CO RATIO (ref 0–0.9)
HDLC SERPL-MCNC: 54 MG/DL
HGB BLD-MCNC: 13.6 G/DL (ref 11.1–15.9)
IMM GRANULOCYTES # BLD AUTO: 0 X10E3/UL (ref 0–0.1)
IMM GRANULOCYTES NFR BLD AUTO: 0 %
LDLC SERPL CALC-MCNC: 102 MG/DL (ref 0–99)
LYMPHOCYTES # BLD AUTO: 1.5 X10E3/UL (ref 0.7–3.1)
LYMPHOCYTES NFR BLD AUTO: 35 %
MCH RBC QN AUTO: 29.7 PG (ref 26.6–33)
MCHC RBC AUTO-ENTMCNC: 33.1 G/DL (ref 31.5–35.7)
MCV RBC AUTO: 90 FL (ref 79–97)
MONOCYTES # BLD AUTO: 0.4 X10E3/UL (ref 0.1–0.9)
MONOCYTES NFR BLD AUTO: 9 %
NEUTROPHILS # BLD AUTO: 2.2 X10E3/UL (ref 1.4–7)
NEUTROPHILS NFR BLD AUTO: 52 %
PLATELET # BLD AUTO: 209 X10E3/UL (ref 150–450)
POTASSIUM SERPL-SCNC: 4.1 MMOL/L (ref 3.5–5.2)
PROT SERPL-MCNC: 7.2 G/DL (ref 6–8.5)
RBC # BLD AUTO: 4.58 X10E6/UL (ref 3.77–5.28)
SODIUM SERPL-SCNC: 141 MMOL/L (ref 134–144)
TRIGL SERPL-MCNC: 55 MG/DL (ref 0–149)
TSH SERPL DL<=0.005 MIU/L-ACNC: 1.18 UIU/ML (ref 0.45–4.5)
VLDLC SERPL CALC-MCNC: 11 MG/DL (ref 5–40)
WBC # BLD AUTO: 4.2 X10E3/UL (ref 3.4–10.8)

## 2022-09-12 ENCOUNTER — OFFICE VISIT (OUTPATIENT)
Dept: FAMILY MEDICINE CLINIC | Facility: CLINIC | Age: 37
End: 2022-09-12

## 2022-09-12 VITALS
WEIGHT: 142 LBS | SYSTOLIC BLOOD PRESSURE: 122 MMHG | DIASTOLIC BLOOD PRESSURE: 82 MMHG | OXYGEN SATURATION: 100 % | HEART RATE: 84 BPM | BODY MASS INDEX: 23.66 KG/M2 | HEIGHT: 65 IN

## 2022-09-12 DIAGNOSIS — Z01.419 WELL WOMAN EXAM WITH ROUTINE GYNECOLOGICAL EXAM: Primary | ICD-10-CM

## 2022-09-12 DIAGNOSIS — Z12.4 CERVICAL CANCER SCREENING: ICD-10-CM

## 2022-09-12 PROCEDURE — 99213 OFFICE O/P EST LOW 20 MIN: CPT | Performed by: NURSE PRACTITIONER

## 2022-09-12 NOTE — PROGRESS NOTES
Olya Box is a 37 y.o. female.     History of Present Illness    Here for papsmear and follow up     The following portions of the patient's history were reviewed and updated as appropriate: allergies, current medications, past family history, past medical history, past social history, past surgical history and problem list.    Review of Systems    Objective   Physical Exam  Exam conducted with a chaperone present.   Constitutional:       Appearance: Normal appearance. She is normal weight.   HENT:      Head: Normocephalic.      Right Ear: Tympanic membrane normal.      Left Ear: Tympanic membrane normal.      Nose: Nose normal.      Mouth/Throat:      Mouth: Mucous membranes are moist.   Eyes:      Pupils: Pupils are equal, round, and reactive to light.   Cardiovascular:      Rate and Rhythm: Normal rate and regular rhythm.      Pulses: Normal pulses.      Heart sounds: Normal heart sounds.   Pulmonary:      Effort: Pulmonary effort is normal.      Breath sounds: Normal breath sounds.   Chest:   Breasts:      Right: Normal. No nipple discharge, skin change, tenderness, axillary adenopathy or supraclavicular adenopathy.      Left: Normal. No nipple discharge, skin change, tenderness, axillary adenopathy or supraclavicular adenopathy.       Abdominal:      General: Abdomen is flat. Bowel sounds are normal.      Palpations: Abdomen is soft.   Genitourinary:     General: Normal vulva.      Labia:         Right: No tenderness or lesion.         Left: No tenderness or lesion.       Vagina: Normal.      Cervix: Discharge (creamy ) present.      Adnexa: Right adnexa normal and left adnexa normal.      Comments: IUD strings visulaized  Musculoskeletal:         General: Normal range of motion.      Cervical back: Normal range of motion.   Lymphadenopathy:      Upper Body:      Right upper body: No supraclavicular, axillary or pectoral adenopathy.      Left upper body: No supraclavicular, axillary or  pectoral adenopathy.      Lower Body: No right inguinal adenopathy. No left inguinal adenopathy.   Skin:     General: Skin is warm.   Neurological:      General: No focal deficit present.      Mental Status: She is alert.   Psychiatric:         Mood and Affect: Mood normal.         Vitals:    09/12/22 1331   BP: 122/82   Pulse: 84   SpO2: 100%     Body mass index is 23.63 kg/m².    Procedures    Assessment & Plan   Problems Addressed this Visit    None     Visit Diagnoses     Well woman exam with routine gynecological exam    -  Primary      Diagnoses       Codes Comments    Well woman exam with routine gynecological exam    -  Primary ICD-10-CM: Z01.419  ICD-9-CM: V72.31                  Education provided in AVS   No follow-ups on file.

## 2022-09-12 NOTE — PROGRESS NOTES
WELL WOMAN EXAM    Cynthia Box is a 37 y.o. female who presents today for a well woman exam.    The following portions of the patient's history were reviewed and updated as appropriate: allergies, current medications, past family history, past medical history, past social history, past surgical history and problem list.    History of Present Illness    GYN HISTORY:  GP1: G1  Family History:    Breast Disease: none   Gyn Disease: none  Menses:    LMP:    IUD, spots at times, no problems with dysmenorrhea.  Pap Hx:    Hx of abnl PAP Smears:  no   Last Pap: 2/18/19  Birth Control method: IUD  Sexual History:   Sexually active, monogamous, in heterosexual relationship.    Number of past sexual partners- defer   Bleeding with/after intercourse:  No  Infections:    No hx of sexually transmitted diseases.  no   Normal vaginal discharge, no hx of vaginal infections.   HIV: n/a  Breast:    No Breast pain, nipple discharge, or masses:  no  ANJALI Exposure:  None    Review of Systems  I have reviewed the ROS as documented by the MA. RADHA Malin         Physical Exam  Vitals reviewed. Exam conducted with a chaperone present.         Gen:  AFVSS, pleasant, cooperative  NECK: No thyromegaly, no LAD.    CV: RRR, no murmurs, rubs, clicks or gallops.    LUNGS: CTA bilaterally.    BREAST: No masses, no dimpling skin, no d/c. No asymmetry noted, no skin changes. No nipple inversion,no retraction, nl fibrocystic changes.  ABD: Soft, non-tender, non-distended. Bowel sounds present. No masses. No HSM.  :   External genitalia: normal  Urethra exam:  nl, no masses  Vaginal mucosa & discharge:  normal, no blood in vault  Cervix: no  inflammation or motion tenderness, no cystocele or rectocele.  Bimanual exam: nl uterine size, no tenderness, no ovarian masses palpated.    Assessment & Plan   Problems Addressed this Visit    None     Visit Diagnoses     Well woman exam with routine gynecological exam    -  Primary      Diagnoses        Codes Comments    Well woman exam with routine gynecological exam    -  Primary ICD-10-CM: Z01.419  ICD-9-CM: V72.31         1. Well woman exam today. Pap smear results are pending, HPV test is pending. She will be notified of results and patient reminded that it may take up to a week for lab testing to be completed.  Pelvic exam is normal.   2. Breast exam is normal.Mammogram due at 38   3. Cholesterol, complete metabolic panel  and complete blood count reviewe with annual from May 2022 for [facility].  Immunizations reviewed and on chart.  Colon cancer screening UTD/ not indicated.  Lung cancer screening: not indicated   4. Patient has declined testing for gonorrhea and chlamydia  5. Patient has  Declined testing for HIV/syphillis and hepatitis A, B and C.     Discussed birth control options once IUD is removed.   Follow up annual for PHM   Pt to get date of IUD insertion, she thinks this is due for removal next year  Follow up PRN

## 2022-09-12 NOTE — PATIENT INSTRUCTIONS
Get IUD date of inserytion, see RAVI Lara as needed for removal when due  Discussed Nexplanon, replacing IUD or partner's vasectomy as BC options

## 2022-09-15 LAB
CYTOLOGIST CVX/VAG CYTO: NORMAL
CYTOLOGY CVX/VAG DOC CYTO: NORMAL
CYTOLOGY CVX/VAG DOC THIN PREP: NORMAL
DX ICD CODE: NORMAL
HIV 1 & 2 AB SER-IMP: NORMAL
HPV I/H RISK 4 DNA CVX QL PROBE+SIG AMP: NEGATIVE
OTHER STN SPEC: NORMAL
STAT OF ADQ CVX/VAG CYTO-IMP: NORMAL

## 2023-09-18 ENCOUNTER — OFFICE VISIT (OUTPATIENT)
Dept: FAMILY MEDICINE CLINIC | Facility: CLINIC | Age: 38
End: 2023-09-18
Payer: COMMERCIAL

## 2023-09-18 VITALS
OXYGEN SATURATION: 99 % | BODY MASS INDEX: 24.32 KG/M2 | HEART RATE: 73 BPM | WEIGHT: 146 LBS | SYSTOLIC BLOOD PRESSURE: 118 MMHG | DIASTOLIC BLOOD PRESSURE: 80 MMHG | HEIGHT: 65 IN | RESPIRATION RATE: 18 BRPM

## 2023-09-18 DIAGNOSIS — R52 ACHING PAIN: ICD-10-CM

## 2023-09-18 DIAGNOSIS — Z00.01 ENCOUNTER FOR PREVENTATIVE ADULT HEALTH CARE EXAM WITH ABNORMAL FINDINGS: Primary | ICD-10-CM

## 2023-09-18 DIAGNOSIS — Z12.31 SCREENING MAMMOGRAM FOR BREAST CANCER: ICD-10-CM

## 2023-09-18 DIAGNOSIS — Z13.220 SCREENING, LIPID: ICD-10-CM

## 2023-09-18 DIAGNOSIS — Z13.228 ENCOUNTER FOR SCREENING FOR OTHER METABOLIC DISORDERS: ICD-10-CM

## 2023-09-18 PROCEDURE — 99396 PREV VISIT EST AGE 40-64: CPT | Performed by: NURSE PRACTITIONER

## 2023-09-18 NOTE — PROGRESS NOTES
Subjective   Cynthia Box is a 38 y.o. female.     History of Present Illness     Estab pt here for physical exam, labs, and annual exam.     She is feeling well, exercising, following healthy diet. She takes athletic greens daily and this contains vitamin D.     Acute on chronic pain behind Left knee, occasionally on the right. Feels like vein throbbing. No leg swelling. No history of varicose veins. N lower leg swelling, heat or pain.    Would like Mammogram, no family history of breast cancer. No breast pain, lumps or bumps. Reviewed risk vs benefit     The following portions of the patient's history were reviewed and updated as appropriate: allergies, current medications, past family history, past medical history, past social history, past surgical history and problem list.    Review of Systems   Constitutional:  Negative for activity change, diaphoresis, fatigue, fever, unexpected weight gain and unexpected weight loss.   HENT:  Negative for congestion and postnasal drip.         Dental exam is utd   Eyes:  Negative for blurred vision and double vision.        Glasses, eye exam is due      Respiratory:  Negative for cough, chest tightness, shortness of breath and wheezing.    Cardiovascular:  Negative for chest pain, palpitations and leg swelling.   Gastrointestinal:  Negative for abdominal pain, blood in stool, constipation, diarrhea and GERD.   Endocrine: Negative for cold intolerance, heat intolerance, polydipsia, polyphagia and polyuria.   Genitourinary:  Negative for breast lump, breast pain, dysuria, frequency and menstrual problem.   Musculoskeletal:  Negative for arthralgias and back pain.   Skin:  Negative for skin lesions.        Sees derm annually    Allergic/Immunologic: Positive for environmental allergies.   Neurological:  Negative for dizziness, seizures, syncope and headache.   Hematological:  Does not bruise/bleed easily.   Psychiatric/Behavioral:  Negative for sleep disturbance and depressed  mood. The patient is not nervous/anxious.      Objective   Physical Exam  Vitals reviewed.   Constitutional:       Appearance: Normal appearance. She is normal weight.   HENT:      Head: Normocephalic.      Right Ear: Tympanic membrane normal.      Left Ear: Tympanic membrane normal.      Nose: Congestion present.      Mouth/Throat:      Mouth: Mucous membranes are moist.   Eyes:      Pupils: Pupils are equal, round, and reactive to light.   Cardiovascular:      Rate and Rhythm: Normal rate and regular rhythm.      Pulses: Normal pulses.      Heart sounds: Normal heart sounds.   Pulmonary:      Effort: Pulmonary effort is normal.      Breath sounds: Normal breath sounds.   Abdominal:      General: Abdomen is flat. Bowel sounds are normal.      Palpations: Abdomen is soft.   Musculoskeletal:         General: Normal range of motion.      Cervical back: Normal range of motion.   Skin:     General: Skin is warm.   Neurological:      General: No focal deficit present.      Mental Status: She is alert.   Psychiatric:         Mood and Affect: Mood normal.       Vitals:    09/18/23 1008   BP: 118/80   Pulse: 73   Resp: 18   SpO2: 99%     Body mass index is 24.3 kg/m².    Procedures    Assessment & Plan   Problems Addressed this Visit    None  Visit Diagnoses       Encounter for preventative adult health care exam with abnormal findings    -  Primary    Screening, lipid        Relevant Orders    Lipid Panel With / Chol / HDL Ratio    Encounter for screening for other metabolic disorders        Relevant Orders    Comprehensive Metabolic Panel    CBC & Differential    Screening mammogram for breast cancer        Relevant Orders    Mammo Screening Digital Tomosynthesis Bilateral With CAD    Aching pain              Diagnoses         Codes Comments    Encounter for preventative adult health care exam with abnormal findings    -  Primary ICD-10-CM: Z00.01  ICD-9-CM: V70.0     Screening, lipid     ICD-10-CM: Z13.220  ICD-9-CM:  V77.91     Encounter for screening for other metabolic disorders     ICD-10-CM: Z13.228  ICD-9-CM: V77.99     Screening mammogram for breast cancer     ICD-10-CM: Z12.31  ICD-9-CM: V76.12     Aching pain     ICD-10-CM: R52  ICD-9-CM: 780.96           Orders Placed This Encounter   Procedures    Mammo Screening Digital Tomosynthesis Bilateral With CAD     Standing Status:   Future     Standing Expiration Date:   9/18/2024     Order Specific Question:   Reason for Exam:     Answer:   breast cancer screen     Order Specific Question:   Release to patient     Answer:   Routine Release [[id number]]    Comprehensive Metabolic Panel     Order Specific Question:   Release to patient     Answer:   Routine Release [[id number]]    Lipid Panel With / Chol / HDL Ratio     Order Specific Question:   Release to patient     Answer:   Routine Release [[id number]]    CBC & Differential     Order Specific Question:   Release to patient     Answer:   Routine Release [[id number]]     Preventative care- Follow heart healthy diet, drink water, walk daily. Wear seatbelts, wear helmets, wear sunscreens. Follow CDC guidelines for covid pandemic.     Aching legs.knees- wear compression socks, elevate legs , reviewed s/sx DVT and provided reassurance     Shared decision making for mammogram screening       Education provided in AVS   Return in about 1 year (around 9/18/2024) for Annual physical.

## 2023-09-19 LAB
ALBUMIN SERPL-MCNC: 4.8 G/DL (ref 3.5–5.2)
ALBUMIN/GLOB SERPL: 2.1 G/DL
ALP SERPL-CCNC: 39 U/L (ref 39–117)
ALT SERPL-CCNC: 10 U/L (ref 1–33)
AST SERPL-CCNC: 14 U/L (ref 1–32)
BASOPHILS # BLD AUTO: 0.03 10*3/MM3 (ref 0–0.2)
BASOPHILS NFR BLD AUTO: 0.8 % (ref 0–1.5)
BILIRUB SERPL-MCNC: 0.7 MG/DL (ref 0–1.2)
BUN SERPL-MCNC: 10 MG/DL (ref 6–20)
BUN/CREAT SERPL: 14.9 (ref 7–25)
CALCIUM SERPL-MCNC: 9.9 MG/DL (ref 8.6–10.5)
CHLORIDE SERPL-SCNC: 105 MMOL/L (ref 98–107)
CHOLEST SERPL-MCNC: 152 MG/DL (ref 0–200)
CHOLEST/HDLC SERPL: 2.87 {RATIO}
CO2 SERPL-SCNC: 26.5 MMOL/L (ref 22–29)
CREAT SERPL-MCNC: 0.67 MG/DL (ref 0.57–1)
EGFRCR SERPLBLD CKD-EPI 2021: 113.5 ML/MIN/1.73
EOSINOPHIL # BLD AUTO: 0.13 10*3/MM3 (ref 0–0.4)
EOSINOPHIL NFR BLD AUTO: 3.3 % (ref 0.3–6.2)
ERYTHROCYTE [DISTWIDTH] IN BLOOD BY AUTOMATED COUNT: 11.8 % (ref 12.3–15.4)
GLOBULIN SER CALC-MCNC: 2.3 GM/DL
GLUCOSE SERPL-MCNC: 92 MG/DL (ref 65–99)
HCT VFR BLD AUTO: 38.4 % (ref 34–46.6)
HDLC SERPL-MCNC: 53 MG/DL (ref 40–60)
HGB BLD-MCNC: 12.9 G/DL (ref 12–15.9)
IMM GRANULOCYTES # BLD AUTO: 0.01 10*3/MM3 (ref 0–0.05)
IMM GRANULOCYTES NFR BLD AUTO: 0.3 % (ref 0–0.5)
LDLC SERPL CALC-MCNC: 89 MG/DL (ref 0–100)
LYMPHOCYTES # BLD AUTO: 1.71 10*3/MM3 (ref 0.7–3.1)
LYMPHOCYTES NFR BLD AUTO: 43.3 % (ref 19.6–45.3)
MCH RBC QN AUTO: 29.9 PG (ref 26.6–33)
MCHC RBC AUTO-ENTMCNC: 33.6 G/DL (ref 31.5–35.7)
MCV RBC AUTO: 89.1 FL (ref 79–97)
MONOCYTES # BLD AUTO: 0.37 10*3/MM3 (ref 0.1–0.9)
MONOCYTES NFR BLD AUTO: 9.4 % (ref 5–12)
NEUTROPHILS # BLD AUTO: 1.7 10*3/MM3 (ref 1.7–7)
NEUTROPHILS NFR BLD AUTO: 42.9 % (ref 42.7–76)
NRBC BLD AUTO-RTO: 0 /100 WBC (ref 0–0.2)
PLATELET # BLD AUTO: 203 10*3/MM3 (ref 140–450)
POTASSIUM SERPL-SCNC: 4.2 MMOL/L (ref 3.5–5.2)
PROT SERPL-MCNC: 7.1 G/DL (ref 6–8.5)
RBC # BLD AUTO: 4.31 10*6/MM3 (ref 3.77–5.28)
SODIUM SERPL-SCNC: 141 MMOL/L (ref 136–145)
TRIGL SERPL-MCNC: 45 MG/DL (ref 0–150)
VLDLC SERPL CALC-MCNC: 10 MG/DL (ref 5–40)
WBC # BLD AUTO: 3.95 10*3/MM3 (ref 3.4–10.8)

## 2024-01-12 ENCOUNTER — HOSPITAL ENCOUNTER (OUTPATIENT)
Dept: MAMMOGRAPHY | Facility: HOSPITAL | Age: 39
Discharge: HOME | End: 2024-01-12
Admitting: NURSE PRACTITIONER
Payer: COMMERCIAL

## 2024-01-12 DIAGNOSIS — Z12.31 SCREENING MAMMOGRAM FOR BREAST CANCER: ICD-10-CM

## 2024-01-12 PROCEDURE — 77067 SCR MAMMO BI INCL CAD: CPT

## 2024-01-12 PROCEDURE — 77063 BREAST TOMOSYNTHESIS BI: CPT

## 2024-08-29 ENCOUNTER — TELEPHONE (OUTPATIENT)
Dept: FAMILY MEDICINE CLINIC | Facility: CLINIC | Age: 39
End: 2024-08-29
Payer: COMMERCIAL

## 2024-08-29 DIAGNOSIS — Z30.432 ENCOUNTER FOR IUD REMOVAL: Primary | ICD-10-CM

## 2024-08-29 NOTE — TELEPHONE ENCOUNTER
Patient is wanting her IUD removed and she would prefer a female provider. Is that something that you do or does she need a referral to OB/GYN. Please advise.

## 2024-10-14 ENCOUNTER — OFFICE VISIT (OUTPATIENT)
Dept: FAMILY MEDICINE CLINIC | Facility: CLINIC | Age: 39
End: 2024-10-14
Payer: COMMERCIAL

## 2024-10-14 VITALS
BODY MASS INDEX: 25.33 KG/M2 | OXYGEN SATURATION: 99 % | WEIGHT: 152 LBS | HEIGHT: 65 IN | SYSTOLIC BLOOD PRESSURE: 120 MMHG | DIASTOLIC BLOOD PRESSURE: 82 MMHG | RESPIRATION RATE: 18 BRPM | HEART RATE: 79 BPM

## 2024-10-14 DIAGNOSIS — E66.3 OVERWEIGHT WITH BODY MASS INDEX (BMI) 25.0-29.9: ICD-10-CM

## 2024-10-14 DIAGNOSIS — Z00.00 ANNUAL PHYSICAL EXAM: Primary | ICD-10-CM

## 2024-10-14 DIAGNOSIS — L30.9 ECZEMA OF BOTH UPPER EXTREMITIES: ICD-10-CM

## 2024-10-14 PROBLEM — R93.3 ABNORMAL CT SCAN, COLON: Status: RESOLVED | Noted: 2020-01-16 | Resolved: 2024-10-14

## 2024-10-14 PROCEDURE — 99396 PREV VISIT EST AGE 40-64: CPT | Performed by: NURSE PRACTITIONER

## 2024-10-14 RX ORDER — TRIAMCINOLONE ACETONIDE 1 MG/G
1 CREAM TOPICAL 2 TIMES DAILY
Qty: 45 G | Refills: 0 | Status: SHIPPED | OUTPATIENT
Start: 2024-10-14 | End: 2024-10-24

## 2024-10-14 NOTE — ASSESSMENT & PLAN NOTE
Patient's (Body mass index is 25.29 kg/m².) indicates that they are overweight with health conditions that include none . Weight is unchanged. BMI is above average; BMI management plan is completed. We discussed portion control, increasing exercise, joining a fitness center or start home based exercise program, and Information on healthy weight added to patient's after visit summary.

## 2024-10-14 NOTE — PROGRESS NOTES
Subjective   Cynthia Box is a 39 y.o. female.   Patient here for annual physical exam, labs, chronic illness management and updated screening.      History of Present Illness   Recent eczema on forearms. She has dermatology. She used triamcinolone. 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     The following portions of the patient's history were reviewed and updated as appropriate: allergies, current medications, past family history, past medical history, past social history, past surgical history and problem list.    Review of Systems   Constitutional:  Negative for activity change, fatigue, unexpected weight gain and unexpected weight loss.   HENT:  Negative for congestion and postnasal drip.    Eyes:  Negative for blurred vision and double vision.   Respiratory:  Negative for cough and chest tightness.    Cardiovascular:  Negative for chest pain, palpitations and leg swelling.   Gastrointestinal:  Negative for abdominal pain, constipation, diarrhea and GERD.   Endocrine: Negative for cold intolerance, heat intolerance, polydipsia, polyphagia and polyuria.   Genitourinary:  Negative for dysuria and frequency.   Musculoskeletal:  Negative for arthralgias.   Neurological:  Negative for dizziness.   Hematological:  Does not bruise/bleed easily.   Psychiatric/Behavioral:  Negative for depressed mood. The patient is not nervous/anxious.          Current Outpatient Medications:     cholecalciferol (VITAMIN D3) 25 MCG (1000 UT) tablet, Take 1 tablet by mouth Daily., Disp: , Rfl:     Misc Natural Products (FIBER 7 PO), Take  by mouth., Disp: , Rfl:     Probiotic Product (Risaquad-2) capsule capsule, Take 1 capsule by mouth Daily., Disp: , Rfl:     triamcinolone (KENALOG) 0.1 % cream, Apply 1 Application topically to the appropriate area as directed 2 (Two) Times a Day for 10 days., Disp: 45 g, Rfl: 0    Objective   Physical Exam  Vitals reviewed.   Constitutional:       Appearance: Normal appearance. She is normal weight.    HENT:      Head: Normocephalic.      Right Ear: Tympanic membrane normal.      Left Ear: Tympanic membrane normal.      Nose: Nose normal.      Mouth/Throat:      Mouth: Mucous membranes are moist.   Eyes:      Pupils: Pupils are equal, round, and reactive to light.   Cardiovascular:      Rate and Rhythm: Normal rate and regular rhythm.      Pulses: Normal pulses.      Heart sounds: Normal heart sounds.   Pulmonary:      Effort: Pulmonary effort is normal.      Breath sounds: Normal breath sounds.   Abdominal:      General: Abdomen is flat. Bowel sounds are normal.      Palpations: Abdomen is soft.   Musculoskeletal:         General: Normal range of motion.      Cervical back: Normal range of motion.   Skin:     General: Skin is warm.      Findings: Rash (areas to bilat forearms now healed and some hyperpigmentation and dryness) present.          Neurological:      General: No focal deficit present.      Mental Status: She is alert.   Psychiatric:         Mood and Affect: Mood normal.         Vitals:    10/14/24 1123   BP: 120/82   Pulse: 79   Resp: 18   SpO2: 99%     Body mass index is 25.29 kg/m².    Procedures    TSH   Date Value Ref Range Status   05/13/2022 1.180 0.450 - 4.500 uIU/mL Final     Hemoglobin A1C   Date Value Ref Range Status   01/04/2021 5.20 4.80 - 5.60 % Final     Comment:     Hemoglobin A1C Ranges:  Increased Risk for Diabetes  5.7% to 6.4%  Diabetes                     >= 6.5%  Diabetic Goal                < 7.0%              Over the past 2 weeks, how often have you been bothered by any of the following problems?  Little interest or pleasure in doing things: Not at all  Feeling down, depressed, or hopeless: Not at all      Assessment & Plan   Problems Addressed this Visit       Overweight with body mass index (BMI) 25.0-29.9     Patient's (Body mass index is 25.29 kg/m².) indicates that they are overweight with health conditions that include none . Weight is unchanged. BMI is above average;  BMI management plan is completed. We discussed portion control, increasing exercise, joining a fitness center or start home based exercise program, and Information on healthy weight added to patient's after visit summary.           Other Visit Diagnoses       Annual physical exam    -  Primary    Eczema of both upper extremities        Relevant Medications    triamcinolone (KENALOG) 0.1 % cream          Diagnoses         Codes Comments    Annual physical exam    -  Primary ICD-10-CM: Z00.00  ICD-9-CM: V70.0     Overweight with body mass index (BMI) 25.0-29.9     ICD-10-CM: E66.3  ICD-9-CM: 278.02     Eczema of both upper extremities     ICD-10-CM: L30.9  ICD-9-CM: 692.9             Preventative care- Follow heart healthy diet, drink water, walk daily. Wear seatbelts, wear helmets, wear sunscreens. Follow CDC guidelines for covid and flu .     Eczema- rx trimacinolone avoid triggers, use emollient and gentle soaps, all scent free dye free products      Education provided in AVS   Return in about 1 year (around 10/14/2025) for Annual physical.

## 2025-02-03 ENCOUNTER — OFFICE VISIT (OUTPATIENT)
Dept: OBSTETRICS AND GYNECOLOGY | Age: 40
End: 2025-02-03
Payer: COMMERCIAL

## 2025-02-03 VITALS
DIASTOLIC BLOOD PRESSURE: 76 MMHG | HEIGHT: 65 IN | SYSTOLIC BLOOD PRESSURE: 116 MMHG | WEIGHT: 149 LBS | BODY MASS INDEX: 24.83 KG/M2

## 2025-02-03 DIAGNOSIS — Z01.419 WELL FEMALE EXAM WITH ROUTINE GYNECOLOGICAL EXAM: ICD-10-CM

## 2025-02-03 DIAGNOSIS — N89.8 VAGINAL ITCHING: ICD-10-CM

## 2025-02-03 DIAGNOSIS — Z11.51 SCREENING FOR HUMAN PAPILLOMAVIRUS (HPV): ICD-10-CM

## 2025-02-03 DIAGNOSIS — Z01.419 ENCOUNTER FOR GYNECOLOGICAL EXAMINATION WITHOUT ABNORMAL FINDING: Primary | ICD-10-CM

## 2025-02-03 DIAGNOSIS — Z12.4 SCREENING FOR MALIGNANT NEOPLASM OF CERVIX: ICD-10-CM

## 2025-02-03 DIAGNOSIS — Z12.31 SCREENING MAMMOGRAM FOR BREAST CANCER: ICD-10-CM

## 2025-02-03 NOTE — PROGRESS NOTES
Routine Annual Visit    2/3/2025    Patient: Cynthia Box          MR#:[id number]      Chief Complaint   Patient presents with    Gynecologic Exam     New Gyn - Referred by PCP, last pap 22 neg, Pt has hx of abnormal pap & loop procedure for treatment, Pt had IUD inserted  & stating IUD expires this year, Pt interested in removal & reinsertion but would like to discuss options, mammo 24, colonoscopy 20, pt has no complaints today       History of Present Illness    39 y.o. female  who presents for annual exam.     Patient is doing well  She has light periods with her Mirena IUD  She was happy to hear that she has more time before her Mirena expires  Mirena was placed on 9/15/2020  She is due for Pap smear  She would like a swab secondary to itching in the vaginal area  She thinks it is more external and internal hemorrhoids  We discussed over-the-counter hemorrhoid cream  Mammogram was ordered  No other complaints  She works at a hair salon      No LMP recorded. Patient has had an implant.  Obstetric History:  OB History          1    Para   1    Term   1            AB        Living   1         SAB        IAB        Ectopic        Molar        Multiple        Live Births   1               Menstrual History:     No LMP recorded. Patient has had an implant.       Sexual History:       ________________________________________  Patient Active Problem List   Diagnosis    Dysfunctional uterine bleeding    Iron (Fe) deficiency anemia    IUD contraception    Endometrial polyp    Overweight with body mass index (BMI) 25.0-29.9       Past Medical History:   Diagnosis Date    Diverticulitis     IBS (irritable bowel syndrome)        Past Surgical History:   Procedure Laterality Date    COLONOSCOPY N/A 2020    Procedure: COLONOSCOPY TO CECUM AND TERMINAL ILEUM WITH BIOPSIES WITH COLD BIOPSY POLYPECTOMY;  Surgeon: Julieta Samuels MD;  Location: [facility] ENDOSCOPY;  Service:  "Gastroenterology;  Laterality: N/A;  PRE- ABNORMAL CT  POST- DIVERTICULOSIS, COLON POLYP, HEMORRHOIDS    LEEP  2009    TONSILLECTOMY         Social History     Tobacco Use   Smoking Status Never    Passive exposure: Never   Smokeless Tobacco Never       has a current medication list which includes the following prescription(s): cholecalciferol, misc natural products, and risaquad-2.  ________________________________________    Current contraception: IUD  History of abnormal Pap smear: Previous LEEP 2009  Family history of Breast cancer: no  Family history of uterine or ovarian cancer: no  Family History of colon cancer/colon polyps: no  History of abnormal mammogram: no      The following portions of the patient's history were reviewed and updated as appropriate: allergies, current medications, past family history, past medical history, past social history, past surgical history, and problem list.    Review of Systems    Pertinent items are noted in HPI.     Objective   Physical Exam    /76 (BP Location: Left arm, Patient Position: Sitting)   Ht 165.1 cm (65\")   Wt 67.6 kg (149 lb)   BMI 24.79 kg/m²    BP Readings from Last 3 Encounters:   02/03/25 116/76   10/14/24 120/82   09/18/23 118/80      Wt Readings from Last 3 Encounters:   02/03/25 67.6 kg (149 lb)   10/14/24 68.9 kg (152 lb)   09/18/23 66.2 kg (146 lb)      BMI: Estimated body mass index is 24.79 kg/m² as calculated from the following:    Height as of this encounter: 165.1 cm (65\").    Weight as of this encounter: 67.6 kg (149 lb).      General:   alert, appears stated age, and cooperative   Abdomen: soft, non-tender, without masses or organomegaly   Breast: inspection negative, no nipple discharge or bleeding, no masses or nodularity palpable   Vulva: normal, Bartholin's, Urethra, Edgeworth's normal   Vagina: normal mucosa   Cervix: no cervical motion tenderness and no lesions   Uterus: normal size, mobile, and non-tender   Adnexa: no mass, " fullness, tenderness     Assessment:    1. Normal annual exam   Assessment     ICD-10-CM ICD-9-CM   1. Encounter for gynecological examination without abnormal finding  Z01.419 V72.31   2. Well female exam with routine gynecological exam  Z01.419 V72.31   3. Screening for human papillomavirus (HPV)  Z11.51 V73.81   4. Screening for malignant neoplasm of cervix  Z12.4 V76.2   5. Screening mammogram for breast cancer  Z12.31 V76.12   6. Vaginal itching  N89.8 698.1     Plan:    Plan     [x]  Mammogram request made  [x]  PAP done  []  Labs:   []  GC/Chl/TV  []  DEXA scan   []  Referral for colonoscopy:       Diagnoses and all orders for this visit:    1. Encounter for gynecological examination without abnormal finding (Primary)    2. Well female exam with routine gynecological exam  -     IGP, Apt HPV,rfx 16 / 18,45    3. Screening for human papillomavirus (HPV)  -     IGP, Apt HPV,rfx 16 / 18,45    4. Screening for malignant neoplasm of cervix  -     IGP, Apt HPV,rfx 16 / 18,45    5. Screening mammogram for breast cancer  -     Mammo Screening Digital Tomosynthesis Bilateral With CAD; Future    6. Vaginal itching  -     NuSwab VG+ - Swab, Vagina            Counseling:  --Nutrition: Stressed importance of moderation and caloric balance, stressed fresh fruit and vegetables  --Exercise: Stressed the importance of regular exercise. 3-5 times weekly   - Discussed screening mammogram recommendations.   --Discussed benefits of screening colonoscopy- age 45 unless FH  --Discussed pap smear screening recommendations

## 2025-02-05 LAB
A VAGINAE DNA VAG QL NAA+PROBE: NORMAL SCORE
BVAB2 DNA VAG QL NAA+PROBE: NORMAL SCORE
C ALBICANS DNA VAG QL NAA+PROBE: NEGATIVE
C GLABRATA DNA VAG QL NAA+PROBE: NEGATIVE
C TRACH DNA SPEC QL NAA+PROBE: NEGATIVE
MEGA1 DNA VAG QL NAA+PROBE: NORMAL SCORE
N GONORRHOEA DNA VAG QL NAA+PROBE: NEGATIVE
T VAGINALIS DNA VAG QL NAA+PROBE: NEGATIVE

## 2025-02-06 LAB
CYTOLOGIST CVX/VAG CYTO: NORMAL
CYTOLOGY CVX/VAG DOC CYTO: NORMAL
CYTOLOGY CVX/VAG DOC THIN PREP: NORMAL
DX ICD CODE: NORMAL
HPV I/H RISK 4 DNA CVX QL PROBE+SIG AMP: NEGATIVE
OTHER STN SPEC: NORMAL
SERVICE CMNT-IMP: NORMAL
STAT OF ADQ CVX/VAG CYTO-IMP: NORMAL

## 2025-02-07 ENCOUNTER — PATIENT ROUNDING (BHMG ONLY) (OUTPATIENT)
Dept: OBSTETRICS AND GYNECOLOGY | Age: 40
End: 2025-02-07
Payer: COMMERCIAL

## 2025-02-07 NOTE — PROGRESS NOTES
A MY CHART MESSAGE HAS BEEN SENT TO THE PATIENT FOR [facility] [place] ROUNDING.

## 2025-05-14 ENCOUNTER — OFFICE VISIT (OUTPATIENT)
Dept: FAMILY MEDICINE CLINIC | Facility: CLINIC | Age: 40
End: 2025-05-14
Payer: COMMERCIAL

## 2025-05-14 VITALS
DIASTOLIC BLOOD PRESSURE: 78 MMHG | BODY MASS INDEX: 25.83 KG/M2 | HEART RATE: 78 BPM | WEIGHT: 155 LBS | SYSTOLIC BLOOD PRESSURE: 118 MMHG | RESPIRATION RATE: 18 BRPM | HEIGHT: 65 IN | OXYGEN SATURATION: 98 %

## 2025-05-14 DIAGNOSIS — H65.93 MEE (MIDDLE EAR EFFUSION), BILATERAL: Primary | ICD-10-CM

## 2025-05-14 DIAGNOSIS — K64.9 HEMORRHOIDS, UNSPECIFIED HEMORRHOID TYPE: ICD-10-CM

## 2025-05-14 DIAGNOSIS — J30.2 SEASONAL ALLERGIES: ICD-10-CM

## 2025-05-14 DIAGNOSIS — H93.8X3 EAR PRESSURE, BILATERAL: ICD-10-CM

## 2025-05-14 PROCEDURE — 99214 OFFICE O/P EST MOD 30 MIN: CPT | Performed by: NURSE PRACTITIONER

## 2025-05-14 RX ORDER — CETIRIZINE HYDROCHLORIDE 10 MG/1
10 TABLET ORAL DAILY
Qty: 30 TABLET | Refills: 0 | Status: SHIPPED | OUTPATIENT
Start: 2025-05-14

## 2025-05-14 RX ORDER — TRETINOIN 0.25 MG/G
1 CREAM TOPICAL
COMMUNITY
Start: 2025-02-23

## 2025-05-14 RX ORDER — AZELASTINE 1 MG/ML
2 SPRAY, METERED NASAL 2 TIMES DAILY
Qty: 30 ML | Refills: 3 | Status: SHIPPED | OUTPATIENT
Start: 2025-05-14

## 2025-05-14 RX ORDER — HYDROCORTISONE 25 MG/G
CREAM TOPICAL 2 TIMES DAILY
Qty: 28 G | Refills: 0 | Status: SHIPPED | OUTPATIENT
Start: 2025-05-14

## 2025-05-14 NOTE — PROGRESS NOTES
Subjective   Cynthia Box is a 39 y.o. female.     Earache       The following portions of the patient's history were reviewed and updated as appropriate: allergies, current medications, past family history, past medical history, past social history, past surgical history and problem list.       The patient is a 39-year-old female who presents for evaluation of ear pain and hemorrhoids.    Acute ear pain for past 3 to 4 days.  She reports experiencing pruritus in her ears, accompanied by a sensation of static noise and crackling.  Recently had symptoms during a flight to [place]. Upon disembarkation, her symptoms subsided. However, they re-emerged during a mountain excursion, with the addition of ear closure. She also experienced a twinge of pain upon bending over or applying pressure to the area. Following her return flight, she has been experiencing ear clogging and hearing impairment, but no pain. She admits to scratching her ears due to the intense itching. She does not have any allergy medications at home. Additionally, she reports chest congestion for the past 3 days, with clear expectoration.    Acute anal itching X 2 months.  On colonoscopy she did have internal hemorrhoids noted.  She has not ever had bleeding or trouble with hemorrhoids.  She denies any rectal bleeding.  She denies constipation she has been using CeraVe cream for protection.    PAST SURGICAL HISTORY:   - Colonoscopy (4 years ago)         Review of Systems   HENT:  Positive for ear pain.            Current Outpatient Medications:     cholecalciferol (VITAMIN D3) 25 MCG (1000 UT) tablet, Take 1 tablet by mouth Daily., Disp: , Rfl:     Misc Natural Products (FIBER 7 PO), Take  by mouth., Disp: , Rfl:     Probiotic Product (Risaquad-2) capsule capsule, Take 1 capsule by mouth Daily., Disp: , Rfl:     tretinoin (RETIN-A) 0.025 % cream, Apply 1 Application topically to the appropriate area as directed., Disp: , Rfl:     azelastine (ASTELIN) 0.1  % nasal spray, Administer 2 sprays into the nostril(s) as directed by provider 2 (Two) Times a Day. Use in each nostril as directed, Disp: 30 mL, Rfl: 3    cetirizine (zyrTEC) 10 MG tablet, Take 1 tablet by mouth Daily., Disp: 30 tablet, Rfl: 0    Hydrocortisone, Perianal, (Procto-Med HC) 2.5 % rectal cream, Insert  into the rectum 2 (Two) Times a Day., Disp: 28 g, Rfl: 0    Objective   Physical Exam  Vitals reviewed.   Constitutional:       Appearance: Normal appearance.   HENT:      Right Ear: Tympanic membrane is bulging.      Left Ear: Tympanic membrane is bulging.      Ears:      Comments: Clear fluid bilat        Nose:      Left Turbinates: Enlarged and swollen.      Mouth/Throat:      Mouth: Mucous membranes are moist.   Cardiovascular:      Rate and Rhythm: Normal rate and regular rhythm.      Pulses: Normal pulses.      Heart sounds: Normal heart sounds.   Pulmonary:      Effort: Pulmonary effort is normal.   Skin:     General: Skin is warm.   Neurological:      General: No focal deficit present.      Mental Status: She is alert.         Vitals:    05/14/25 1451   BP: 118/78   Pulse: 78   Resp: 18   SpO2: 98%     Body mass index is 25.79 kg/m².    Procedures    TSH   Date Value Ref Range Status   05/13/2022 1.180 0.450 - 4.500 uIU/mL Final     Hemoglobin A1C   Date Value Ref Range Status   01/04/2021 5.20 4.80 - 5.60 % Final     Comment:     Hemoglobin A1C Ranges:  Increased Risk for Diabetes  5.7% to 6.4%  Diabetes                     >= 6.5%  Diabetic Goal                < 7.0%              Over the past 2 weeks, how often have you been bothered by any of the following problems?  Little interest or pleasure in doing things: Not at all  Feeling down, depressed, or hopeless: Not at all      Assessment & Plan   Problems Addressed this Visit    None  Visit Diagnoses         CHAN (middle ear effusion), bilateral    -  Primary      Ear pressure, bilateral          Seasonal allergies          Hemorrhoids,  unspecified hemorrhoid type              Diagnoses         Codes Comments      CHAN (middle ear effusion), bilateral    -  Primary ICD-10-CM: H65.93  ICD-9-CM: 381.4       Ear pressure, bilateral     ICD-10-CM: H93.8X3  ICD-9-CM: 388.8       Seasonal allergies     ICD-10-CM: J30.2  ICD-9-CM: 477.9       Hemorrhoids, unspecified hemorrhoid type     ICD-10-CM: K64.9  ICD-9-CM: 455.6                1. Otalgia-effusion-allergies.    - Discussion includes the impact of environmental changes and allergies, with recommendations for jaw exercises, massage, chewing gum, tapping, and humming to aid eustachian tube function. Local honey suggested for allergy relief.  - Treatment includes Astelin nasal spray, 2 sprays in each nostril twice daily, and Zyrtec once daily for 1 to 2 weeks.    2. Hemorrhoids.  - Symptoms include persistent itching without visible external hemorrhoids, potentially exacerbated by long airplane rides.  - History of internal hemorrhoids confirmed by previous colonoscopy.  - Counseling provided on dietary and lifestyle modifications, including high-fiber diet, adequate hydration, and avoiding prolonged sitting. Desitin recommended for soothing and preventing dryness.  - Treatment includes hydrocortisone 2.5% cream, applied twice daily for 5 days.              Education provided in AVS   No follow-ups on file.    Patient or patient representative verbalized consent for the use of Ambient Listening during the visit with  RADHA Malin for chart documentation. 5/14/2025  15:14 EDT

## (undated) DEVICE — TUBING, SUCTION, 1/4" X 10', STRAIGHT: Brand: MEDLINE

## (undated) DEVICE — CANN O2 ETCO2 FITS ALL CONN CO2 SMPL A/ 7IN DISP LF

## (undated) DEVICE — KT ORCA ORCAPOD DISP STRL

## (undated) DEVICE — SENSR O2 OXIMAX FNGR A/ 18IN NONSTR

## (undated) DEVICE — LN SMPL CO2 SHTRM SD STREAM W/M LUER

## (undated) DEVICE — SINGLE-USE BIOPSY FORCEPS: Brand: RADIAL JAW 4

## (undated) DEVICE — ADAPT CLN BIOGUARD AIR/H2O DISP

## (undated) DEVICE — THE TORRENT IRRIGATION SCOPE CONNECTOR IS USED WITH THE TORRENT IRRIGATION TUBING TO PROVIDE IRRIGATION FLUIDS SUCH AS STERILE WATER DURING GASTROINTESTINAL ENDOSCOPIC PROCEDURES WHEN USED IN CONJUNCTION WITH AN IRRIGATION PUMP (OR ELECTROSURGICAL UNIT).: Brand: TORRENT